# Patient Record
Sex: FEMALE | Race: WHITE | NOT HISPANIC OR LATINO | Employment: FULL TIME | URBAN - METROPOLITAN AREA
[De-identification: names, ages, dates, MRNs, and addresses within clinical notes are randomized per-mention and may not be internally consistent; named-entity substitution may affect disease eponyms.]

---

## 2017-01-28 ENCOUNTER — TELEPHONE (OUTPATIENT)
Dept: OTHER | Facility: CLINIC | Age: 37
End: 2017-01-28

## 2017-02-14 ENCOUNTER — INFUSION - HEALTHEAST (OUTPATIENT)
Dept: INFUSION THERAPY | Facility: CLINIC | Age: 37
End: 2017-02-14

## 2017-02-14 DIAGNOSIS — N39.0 URINARY TRACT INFECTION: ICD-10-CM

## 2017-08-14 ENCOUNTER — RECORDS - HEALTHEAST (OUTPATIENT)
Dept: LAB | Facility: CLINIC | Age: 37
End: 2017-08-14

## 2017-08-14 LAB
ALT SERPL W P-5'-P-CCNC: 16 U/L (ref 0–45)
AST SERPL W P-5'-P-CCNC: 14 U/L (ref 0–40)
CREAT SERPL-MCNC: 0.67 MG/DL (ref 0.6–1.1)
GFR SERPL CREATININE-BSD FRML MDRD: >60 ML/MIN/1.73M2

## 2017-08-28 ENCOUNTER — ANESTHESIA - HEALTHEAST (OUTPATIENT)
Dept: OBGYN | Facility: CLINIC | Age: 37
End: 2017-08-28

## 2017-08-28 ENCOUNTER — SURGERY - HEALTHEAST (OUTPATIENT)
Dept: OBGYN | Facility: CLINIC | Age: 37
End: 2017-08-28

## 2017-08-29 ENCOUNTER — SURGERY - HEALTHEAST (OUTPATIENT)
Dept: OBGYN | Facility: CLINIC | Age: 37
End: 2017-08-29

## 2017-08-29 ASSESSMENT — MIFFLIN-ST. JEOR: SCORE: 1843.89

## 2017-09-01 ENCOUNTER — HOME CARE/HOSPICE - HEALTHEAST (OUTPATIENT)
Dept: HOME HEALTH SERVICES | Facility: HOME HEALTH | Age: 37
End: 2017-09-01

## 2017-09-05 ENCOUNTER — COMMUNICATION - HEALTHEAST (OUTPATIENT)
Dept: OBGYN | Facility: CLINIC | Age: 37
End: 2017-09-05

## 2017-09-17 ENCOUNTER — COMMUNICATION - HEALTHEAST (OUTPATIENT)
Dept: SCHEDULING | Facility: CLINIC | Age: 37
End: 2017-09-17

## 2017-12-15 ENCOUNTER — OFFICE VISIT (OUTPATIENT)
Dept: PODIATRY | Facility: CLINIC | Age: 37
End: 2017-12-15
Payer: COMMERCIAL

## 2017-12-15 DIAGNOSIS — M72.2 PLANTAR FASCIAL FIBROMATOSIS: Primary | ICD-10-CM

## 2017-12-15 PROCEDURE — 20550 NJX 1 TENDON SHEATH/LIGAMENT: CPT | Mod: RT | Performed by: PODIATRIST

## 2017-12-15 PROCEDURE — 99203 OFFICE O/P NEW LOW 30 MIN: CPT | Mod: 25 | Performed by: PODIATRIST

## 2017-12-15 RX ORDER — TRIAMCINOLONE ACETONIDE 40 MG/ML
40 INJECTION, SUSPENSION INTRA-ARTICULAR; INTRAMUSCULAR ONCE
Qty: 2 ML | Refills: 0 | OUTPATIENT
Start: 2017-12-15 | End: 2017-12-15

## 2017-12-15 NOTE — MR AVS SNAPSHOT
After Visit Summary   12/15/2017    Mary Reyes    MRN: [id number]           Patient Information     Date Of Birth          1980        Visit Information        Provider Department      12/15/2017 9:30 AM Adam Blair DPM [facility]        Care Instructions    Thank you for choosing [facility]!    DR. BLAIR'S CLINIC LOCATIONS:   MONDAY - [place] TUESDAY - [place]   [address] [facility]  [address]   [place] [address] [place] [address]   [phone]       THURSDAY AM - [place] THURSDAY PM - [place]   [address]   [place] [address] [place] [address]   [phone]       FRIDAY AM - [place] SET UP SURGERY: [phone] [address] APPOINTMENTS: [phone]   [place] [address] BILLING QUESTIONS: [phone] FAX NUMBER: [phone]     Follow Up: as needed    PLANTAR FASCIITIS    Plantar fasciitis is often referred to as heel spurs or heel pain. Plantar fasciitis is a very common problem that affects people of all foot shapes, age, weight and activity level. Pain may be in the arch or on the weight-bearing surface of the heel. The pain may come on without injury or identifiable cause. Pain is generally present when first getting out of bed in the morning or up from a seated break.     CAUSES  The plantar fascia is a dense fibrous band of tissue that stretches across the bottom surface of the foot. The fascia helps support the foot muscles and arch. Plantar fasciitis is thought to be caused by mechanical strain or overload. Frequent walking without shoes or wearing unsupportive shoes is thought to cause structural overload and ultimately inflammation of the plantar fascia. Some people have heel spurs that can be seen on x-ray. The heel spur is actually a minor component of plantar fascitis and is largely ignored.       SELF  TREATMENT   The easiest solution is to stop walking around your home without shoes. Plantar fasciitis is largely a shoe problem. Shoes are either not being worn often enough or your current shoes are inadequate for your weight, foot structure or activity level. The majority of shoes on the market today are not sufficient to resist development of plantar fasciitis or to promote healing. Assume that your current shoes are inadequate and will need to be replaced. Even high quality shoes wear out with 6 months to one year of frequent use. Weight loss is another option. Losing ten pounds in the next two months may be enough to resolve the problem. Ice applied to the area of pain two to three times per day for ten minutes each session can be very helpful. This should continue until the problem resolves. Achilles tendon stretching is essential. Stretch multiple times daily to promote healing and to prevent recurrence in the future.     MEDICAL TREATMENT  Medical treatments often include custom arch supports, cortisone injections, physical therapy, splints to be worn in bed, prescription medications and surgery. The home treatments listed above will be necessary regardless of these advanced medical treatments. Surgery is rarely needed but is very helpful in selected cases.     PROGNOSIS  Plantar fasciitis can last from one day to a lifetime. Some people get intermittent fascitis that is very short-lived. Others suffer daily for years. Excessive body weight, frequent bare foot walking, long hours on the feet, inadequate shoes, predisposing foot structures and excessive activity such as running are all potential issues that lead to chronic and/or recurring plantar fascitis. Having plantar fasciitis means that you are forever prone to this problem and will require modification of some of the above factors. Most people seek treatment within one to four months. Healing usually requires a similar one to four month time frame.  Healing time is relative to the amount of effort spent treating the problem.   Plantar fasciitis is highly recurrent. Risk factors often continue, including return to bare foot walking, inadequate shoes, excessive body weight, excessive activities, etc. Your life style and foot structure may predispose you to recurrent plantar fasciitis. A daily prevention regimen can be very helpful. Ongoing use of shoe inserts, careful attention to appropriate shoes, daily Achilles stretching, etc. may prevent recurrence. Prompt attention at the earliest warning signs of heel pain can resolve the problem in as short as a few days.     EXERCISES    Stair Exercise: Step on the stairs with the ball of your foot and hold your position for at least 15 seconds, then slowly step down with the heels of your foot. You can do this daily and as often as you want.   Picking the Towel: Sit comfortably and then pick the towel up with your toes. You can use any object other than a towel as long as the material can be soft and you can pick it up with your toes.  Rolling the Bottle: Use a small ball or frozen water bottle and then roll it around with your foot.   Flex the Toes: Sit comfortably and then flex your toes by pointing it towards the floor or towards your body. This will relax and flex your foot and exercise your plantar fascia, the calf, and the Achilles tendon. The inability of the foot to stretch often causes the bunching up of the plantar fascia area leading to the pain.  Calf/Achilles Stretching: Lay on you back and raise one foot, then point your toes towards the floor. See photo below:               Hold each stretch for 10 seconds. Stretch 10 times per set, three sets per day. Morning, afternoon and evening. If your heel pain is very severe in the morning, consider doing the first set of stretches before you get out of bed.    THERAPIES COVERED:  1.  Supportive Shoes: minimizing barefoot ambulation helps to provide cushion,  padding and support to the ligament that is inflamed. Socks, flip flops, flats and some slippers are not typically sufficient to provide support. Shoes should be worn even indoors  2.  Insert/Orthotics: ones with an arch support built in to them provide further stress relief for the ligament. See the information below on recommended inserts.  3. Icing: using a frozen water bottle or orange, and rolling it along the bottom of the arch/heel can help to alleviate discomfort, and can act as a tissue massage to the painful, inflamed ligament.  There is evidence that shows icing at least three times daily can be beneficial  4.  Antiinflammatory (NSAID): Ibuprofen, Aleve, as well as Tylenol can be used to help decrease symptoms and improve pain levels. If you have high blood pressure, heart disease, stomach or kidney problems, use antiinflammatories sparingly. Tylenol should not be used if you have liver problems.   5. Activity Modifications: if there are certain things that you do, whether it's going barefoot or certain shoes/activities, you should try to minimize those activities as much as possible until your symptoms are sufficiently resolved. Certainly, some activities, such as running on the treadmill, are easier to take a break from versus others, such as work or chores at home. If there are certain activities that hurt your heel, and you keep doing those activities that hurt your heel, your heel will keep hurting.  **If these initial therapies are insufficient, we have our tier 2 therapies that can more aggressively work to improve your symptoms and get you back to the activities that you enjoy!    OVER THE COUNTER INSERTS    SuperFeet   Sofsole Fit Spenco   Power Step   Walk-Fit Arch Cradles     Most of these can be found at your local [employer], sporting [employer], or online.  **A good high quality over the counter insert should cost around $40-$50      [employer] [facility]  [address]  [facility]  [phone]   [address]  [phone] [place]  [address] [facility]  [phone]   [place]  [address]  [phone]   [place]  [address]  [phone] [place]  [address]  [phone]   [place]  [address]  [phone]   [place]  [address] [facility]  [phone] [place]  [address]  [phone]           Body Mass Index (BMI)  Many things can cause foot and ankle problems. Foot structure, activity level, foot mechanics and injuries are common causes of pain. One very important issue that often goes unmentioned, is body weight. Extra weight can cause increased stress on muscles, ligaments, bones and tendons. Sometimes just a few extra pounds is all it takes to put one over her/his threshold. Without reducing that stress, it can be difficult to alleviate pain. Some people are uncomfortable addressing this issue, but we feel it is important for you to think about it. As Foot &  Ankle specialists, our job is addressing the lower extremity problem and possible causes. Regarding extra body weight, we encourage patients to discuss diet and weight management plans with their primary care doctors. It is this team approach that gives you the best opportunity for pain relief and getting you back on your feet.            Follow-ups after your visit        Your next 10 appointments already scheduled     Dec 21, 2017 12:10 PM CST   PROCEDURE with Charity Delaney PA-C   [facility] ([facility])    [address] [place] [address] [phone]              Who to contact     If you have questions or need follow up information about today's clinic visit or your schedule please contact [facility] directly at [phone].  Normal or non-critical lab and imaging results will be communicated to you by  "MyChart, letter or phone within 4 business days after the clinic has received the results. If you do not hear from us within 7 days, please contact the clinic through [employer] or phone. If you have a critical or abnormal lab result, we will notify you by phone as soon as possible.  Submit refill requests through [email or web address] or call your pharmacy and they will forward the refill request to us. Please allow 3 business days for your refill to be completed.          Additional Information About Your Visit        [employer] Information     [email or web address] lets you send messages to your doctor, view your test results, renew your prescriptions, schedule appointments and more. To sign up, go to [email or web address] . Click on \"Log in\" on the left side of the screen, which will take you to the Welcome page. Then click on \"Sign up Now\" on the right side of the page.     You will be asked to enter the access code listed below, as well as some personal information. Please follow the directions to create your username and password.     Your access code is: [email or web address]  Expires: 3/15/2018 10:05 AM     Your access code will  in 90 days. If you need help or a new code, please call your [facility] clinic or [phone].        Care EveryWhere ID     This is your Care EveryWhere ID. This could be used by other organizations to access your [facility] medical records  [id number]         Blood Pressure from Last 3 Encounters:   16 106/69   14 137/72   13 113/68    Weight from Last 3 Encounters:   16 192 lb 8 oz (87.3 kg)   12/30/15 193 lb 4.8 oz (87.7 kg)   04/23/15 173 lb (78.5 kg)              Today, you had the following     No orders found for display       Primary Care Provider Fax #    [facility] [phone]       [facility] [address]        Equal Access to Services     SAMAN[place]A [place] : nava Leyva, waqar cortez " chinmay finchradha gaonaaan ah. So [facility] [phone].    ATENCIÓN: Si franciscola riccardo, tiene a cobian disposición servicios gratuitos de asistencia lingüística. Chen al [phone].    We comply with applicable federal civil rights laws and [place] laws. We do not discriminate on the basis of race, color, national origin, age, disability, sex, sexual orientation, or gender identity.            Thank you!     Thank you for choosing [facility]  for your care. Our goal is always to provide you with excellent care. Hearing back from our patients is one way we can continue to improve our services. Please take a few minutes to complete the written survey that you may receive in the mail after your visit with us. Thank you!             Your Updated Medication List - Protect others around you: Learn how to safely use, store and throw away your medicines at www.disposemymeds.org.          This list is accurate as of: 12/15/17 10:05 AM.  Always use your most recent med list.                   Brand Name Dispense Instructions for use Diagnosis    Biotin 1 MG Caps      Take by mouth daily        CALCIUM + D PO      Take  by mouth. 1 PO each meal        FISH OIL CONCENTRATE PO      Take 2 capsules by mouth daily        IRON CR PO      Take by mouth daily        MULTI-VITAMIN PO      Take  by mouth daily. 2 PO daily.        VITAMIN B-12 SL      Place 2,000 mcg under the tongue daily.        VITAMIN D (ERGOCALCIFEROL) PO      Take 2,000 Int'l Units by mouth. 2 daily.        ZOLPIDEM TARTRATE PO      Take 10 mg by mouth nightly as needed.

## 2017-12-15 NOTE — PROGRESS NOTES
"Foot & Ankle Surgery  December 15, 2017    CC: bilateral heel pain L>R    I was asked to see Mary Eric regarding the chief complaint by:  self    HPI:  Pt is a 36 year old female who presents with above complaint.  Bilateral heel pain L>R x \"months\". No injury noted.  Previous history of plantar fasciitis, this improved but she's had a recurrence of discomfort.  Had a baby 3 months ago.  Points to plantar heels, describes AM pain.  2/10 daily.  Worse in AM. Has done stretches, ice, rolling.      ROS:   Pos for CC.  The patient denies current nausea, vomiting, chills, fevers, belly pain, calf pain, chest pain or SOB.  Complete remainder of ROS is otherwise neg.    VITALS:  There were no vitals filed for this visit.    PMH:    Past Medical History:   Diagnosis Date     PONV (postoperative nausea and vomiting)        SXHX:    Past Surgical History:   Procedure Laterality Date     ENT SURGERY      wisdom teeth out     LAPAROSCOPIC GASTRIC ADJUSTABLE BANDING  1/26/2012    Procedure:LAPAROSCOPIC GASTRIC ADJUSTABLE BANDING; LAPAROSCOPIC  ADJUSTABLE GASTRIC BANDING ; Surgeon:TRAVIS RAO; Location: OR     LAPAROSCOPIC GASTRIC ADJUSTABLE BANDING  8/8/2013    Procedure: LAPAROSCOPIC GASTRIC ADJUSTABLE BANDING;  LAPAROSCOPIC REPOSITIONING OF SLIPPED LAP BAND AND LAP BAND PORT REPLACEMENT (RAPID PORT) ;  Surgeon: Travis Rao MD;  Location:  OR     REPLACE PORT GASTRIC BAND  8/8/2013    Procedure: REPLACE PORT GASTRIC BAND;;  Surgeon: Travis Rao MD;  Location:  OR        MEDS:    Current Outpatient Prescriptions   Medication     IRON CR PO     Omega-3 Fatty Acids (FISH OIL CONCENTRATE PO)     Biotin 1 MG CAPS     Calcium Carbonate-Vitamin D (CALCIUM + D PO)     Cyanocobalamin (VITAMIN B-12 SL)     ZOLPIDEM TARTRATE PO     Multiple Vitamin (MULTI-VITAMIN PO)     VITAMIN D, ERGOCALCIFEROL, PO     No current facility-administered medications for this visit.        ALL:   No Known Allergies    FMH:  No family " history on file.    SocHx:    Social History     Social History     Marital status: Single     Spouse name: N/A     Number of children: N/A     Years of education: N/A     Occupational History     Not on file.     Social History Main Topics     Smoking status: Former Smoker     Packs/day: 0.50     Years: 10.00     Types: Cigarettes     Quit date: 1/1/2006     Smokeless tobacco: Never Used     Alcohol use No     Drug use: No     Sexual activity: Not on file     Other Topics Concern     Not on file     Social History Narrative           EXAMINATION:  Gen:   No apparent distress  Neuro:   A&Ox3, no deficits  Psych:    Answering questions appropriately for age and situation with normal affect  Head:    NCAT  Eye:    Visual scanning without deficit  Ear:    Response to auditory stimuli wnl  Lung:    Non-labored breathing on RA noted  Abd:    NTND per patient report  Lymph:    Neg for pitting/non-pitting edema BLE  Vasc:    Pulses palpable, CFT minimally delayed  Neuro:    Light touch sensation intact to all sensory nerve distributions without paresthesias  Derm:    Neg for nodules, lesions or ulcerations  MSK:    Heel exam L>R - pain at PF insertion.  No calcaneal pain, no ICN/tibial nerve pain, no Achilles/PT tendon pain  Calf:    Neg for redness, swelling or tenderness      Assessment:  36 year old female with bilateral plantar fasciitis L>R      Plan:  Discussed etiologies and options  1.  Bilateral plantar fasciitis L>R  -Regarding the plantar fasciitis, the Plantar Fasciitis handout was dispensed and discussed.  We talked about stretching, resting/activity modification, icing, tylenol use as tolerated, inserts, supportive shoes and minimizing shoeless walking.    -discussed Achilles, plantar fascial and hamstring stretches  -OTC insert information dispensed and discussed   -injection bilateral heel, see procedure note    After obtaining written consent, the skin was prepped with alcohol.  The needle was advance to  the underlying left and right plantar fascial insertion, aspiration was done with position change.  1 1/2cc mixture of 2:1 kenalog 40:0.25% marcaine plain was injected.  The patient tolerated the procedure without complication.  Risks that were discussed included possible joint/soft tissue damage, neuritis/numbness, infection, pigment change, steroid flare.       Follow up:  Prn based on injection results or sooner with acute issues      Patient's medical history was reviewed today    There is no height or weight on file to calculate BMI.  Weight management plan: Patient was referred to their PCP to discuss a diet and exercise plan.        Adam Cannon DPM   Podiatric Foot & Ankle Surgeon  [facility]  [phone]

## 2017-12-15 NOTE — PATIENT INSTRUCTIONS
Thank you for choosing [facility]!    DR. BLAIR'S CLINIC LOCATIONS:   MONDAY - [place] TUESDAY - [place]   [address] [facility]  [address]   [place] [address] [place] [address]   [phone]       THURSDAY AM - [place] THURSDAY PM - [place]   [address]   [place] [address] [place] [address]   [phone]       FRIDAY AM - [place] SET UP SURGERY: [phone] [address] APPOINTMENTS: [phone]   [place] [address] BILLING QUESTIONS: [phone] FAX NUMBER: [phone]     Follow Up: as needed    PLANTAR FASCIITIS    Plantar fasciitis is often referred to as heel spurs or heel pain. Plantar fasciitis is a very common problem that affects people of all foot shapes, age, weight and activity level. Pain may be in the arch or on the weight-bearing surface of the heel. The pain may come on without injury or identifiable cause. Pain is generally present when first getting out of bed in the morning or up from a seated break.     CAUSES  The plantar fascia is a dense fibrous band of tissue that stretches across the bottom surface of the foot. The fascia helps support the foot muscles and arch. Plantar fasciitis is thought to be caused by mechanical strain or overload. Frequent walking without shoes or wearing unsupportive shoes is thought to cause structural overload and ultimately inflammation of the plantar fascia. Some people have heel spurs that can be seen on x-ray. The heel spur is actually a minor component of plantar fascitis and is largely ignored.       SELF TREATMENT   The easiest solution is to stop walking around your home without shoes. Plantar fasciitis is largely a shoe problem. Shoes are either not being worn often enough or your current shoes are inadequate for your weight, foot structure or activity level. The majority of shoes on the market today are  not sufficient to resist development of plantar fasciitis or to promote healing. Assume that your current shoes are inadequate and will need to be replaced. Even high quality shoes wear out with 6 months to one year of frequent use. Weight loss is another option. Losing ten pounds in the next two months may be enough to resolve the problem. Ice applied to the area of pain two to three times per day for ten minutes each session can be very helpful. This should continue until the problem resolves. Achilles tendon stretching is essential. Stretch multiple times daily to promote healing and to prevent recurrence in the future.     MEDICAL TREATMENT  Medical treatments often include custom arch supports, cortisone injections, physical therapy, splints to be worn in bed, prescription medications and surgery. The home treatments listed above will be necessary regardless of these advanced medical treatments. Surgery is rarely needed but is very helpful in selected cases.     PROGNOSIS  Plantar fasciitis can last from one day to a lifetime. Some people get intermittent fascitis that is very short-lived. Others suffer daily for years. Excessive body weight, frequent bare foot walking, long hours on the feet, inadequate shoes, predisposing foot structures and excessive activity such as running are all potential issues that lead to chronic and/or recurring plantar fascitis. Having plantar fasciitis means that you are forever prone to this problem and will require modification of some of the above factors. Most people seek treatment within one to four months. Healing usually requires a similar one to four month time frame. Healing time is relative to the amount of effort spent treating the problem.   Plantar fasciitis is highly recurrent. Risk factors often continue, including return to bare foot walking, inadequate shoes, excessive body weight, excessive activities, etc. Your life style and foot structure may predispose you to  recurrent plantar fasciitis. A daily prevention regimen can be very helpful. Ongoing use of shoe inserts, careful attention to appropriate shoes, daily Achilles stretching, etc. may prevent recurrence. Prompt attention at the earliest warning signs of heel pain can resolve the problem in as short as a few days.     EXERCISES    Stair Exercise: Step on the stairs with the ball of your foot and hold your position for at least 15 seconds, then slowly step down with the heels of your foot. You can do this daily and as often as you want.   Picking the Towel: Sit comfortably and then pick the towel up with your toes. You can use any object other than a towel as long as the material can be soft and you can pick it up with your toes.  Rolling the Bottle: Use a small ball or frozen water bottle and then roll it around with your foot.   Flex the Toes: Sit comfortably and then flex your toes by pointing it towards the floor or towards your body. This will relax and flex your foot and exercise your plantar fascia, the calf, and the Achilles tendon. The inability of the foot to stretch often causes the bunching up of the plantar fascia area leading to the pain.  Calf/Achilles Stretching: Lay on you back and raise one foot, then point your toes towards the floor. See photo below:               Hold each stretch for 10 seconds. Stretch 10 times per set, three sets per day. Morning, afternoon and evening. If your heel pain is very severe in the morning, consider doing the first set of stretches before you get out of bed.    THERAPIES COVERED:  1.  Supportive Shoes: minimizing barefoot ambulation helps to provide cushion, padding and support to the ligament that is inflamed. Socks, flip flops, flats and some slippers are not typically sufficient to provide support. Shoes should be worn even indoors  2.  Insert/Orthotics: ones with an arch support built in to them provide further stress relief for the ligament. See the information  below on recommended inserts.  3. Icing: using a frozen water bottle or orange, and rolling it along the bottom of the arch/heel can help to alleviate discomfort, and can act as a tissue massage to the painful, inflamed ligament.  There is evidence that shows icing at least three times daily can be beneficial  4.  Antiinflammatory (NSAID): Ibuprofen, Aleve, as well as Tylenol can be used to help decrease symptoms and improve pain levels. If you have high blood pressure, heart disease, stomach or kidney problems, use antiinflammatories sparingly. Tylenol should not be used if you have liver problems.   5. Activity Modifications: if there are certain things that you do, whether it's going barefoot or certain shoes/activities, you should try to minimize those activities as much as possible until your symptoms are sufficiently resolved. Certainly, some activities, such as running on the treadmill, are easier to take a break from versus others, such as work or chores at home. If there are certain activities that hurt your heel, and you keep doing those activities that hurt your heel, your heel will keep hurting.  **If these initial therapies are insufficient, we have our tier 2 therapies that can more aggressively work to improve your symptoms and get you back to the activities that you enjoy!    OVER THE COUNTER INSERTS    SuperFeet   Sofsole Fit Spenco   Power Step   Walk-Fit Arch Cradles     Most of these can be found at your local "[employer] sporting [employer], or online.  **A good high quality over the counter insert should cost around $40-$50      [employer] LOCATIONS    [address]  [phone]   [address]  [phone] [place]  [address] [place] [address]  [phone]   [place]  [address] [place] [address] [address].  [phone]   [place]  [address]  [phone] [place] [address]  [phone]   [place]  [address] [place] [address]  [phone]    [place]  [address] [facility]  [phone] [place]  [address] [facility] [phone]           Body Mass Index (BMI)  Many things can cause foot and ankle problems. Foot structure, activity level, foot mechanics and injuries are common causes of pain. One very important issue that often goes unmentioned, is body weight. Extra weight can cause increased stress on muscles, ligaments, bones and tendons. Sometimes just a few extra pounds is all it takes to put one over her/his threshold. Without reducing that stress, it can be difficult to alleviate pain. Some people are uncomfortable addressing this issue, but we feel it is important for you to think about it. As Foot &  Ankle specialists, our job is addressing the lower extremity problem and possible causes. Regarding extra body weight, we encourage patients to discuss diet and weight management plans with their primary care doctors. It is this team approach that gives you the best opportunity for pain relief and getting you back on your feet.

## 2017-12-21 ENCOUNTER — OFFICE VISIT (OUTPATIENT)
Dept: SURGERY | Facility: CLINIC | Age: 37
End: 2017-12-21
Payer: COMMERCIAL

## 2017-12-21 VITALS — WEIGHT: 217 LBS | BODY MASS INDEX: 35.02 KG/M2

## 2017-12-21 DIAGNOSIS — Z98.84 BARIATRIC SURGERY STATUS: Primary | ICD-10-CM

## 2017-12-21 DIAGNOSIS — E66.9 OBESITY (BMI 30-39.9): ICD-10-CM

## 2017-12-21 DIAGNOSIS — Z46.51 FITTING AND ADJUSTMENT OF GASTRIC LAP BAND: ICD-10-CM

## 2017-12-21 PROCEDURE — S2083 ADJUSTMENT GASTRIC BAND: HCPCS | Performed by: PHYSICIAN ASSISTANT

## 2017-12-21 PROCEDURE — 99207 ZZC NO CHARGE LOS: CPT | Performed by: PHYSICIAN ASSISTANT

## 2017-12-21 NOTE — MR AVS SNAPSHOT
"              After Visit Summary   12/21/2017    Mary Reyes    MRN: [id number]           Patient Information     Date Of Birth          1980        Visit Information        Provider Department      12/21/2017 12:10 PM Charity Delaney PA-C [facility]      Today's Diagnoses     Bariatric surgery status    -  1    Obesity (BMI 30-39.9)        Fitting and adjustment of gastric lap band           Follow-ups after your visit        Follow-up notes from your care team     Return if symptoms worsen or fail to improve, for Band Assessment.      Who to contact     If you have questions or need follow up information about today's clinic visit or your schedule please contact [place] [facility] directly at [phone].  Normal or non-critical lab and imaging results will be communicated to you by [employer], letter or phone within 4 business days after the clinic has received the results. If you do not hear from us within 7 days, please contact the clinic through [employer] or phone. If you have a critical or abnormal lab result, we will notify you by phone as soon as possible.  Submit refill requests through [employer] or call your pharmacy and they will forward the refill request to us. Please allow 3 business days for your refill to be completed.          Additional Information About Your Visit        MyChart Information     [employer] lets you send messages to your doctor, view your test results, renew your prescriptions, schedule appointments and more. To sign up, go to [email or web address] . Click on \"Log in\" on the left side of the screen, which will take you to the Welcome page. Then click on \"Sign up Now\" on the right side of the page.     You will be asked to enter the access code listed below, as well as some personal information. Please follow the directions to create your username and password.   "   Your access code is: [email or web address]  Expires: 3/15/2018 10:05 AM     Your access code will  in 90 days. If you need help or a new code, please call your [facility] clinic or [phone].        Care EveryWhere ID     This is your Care EveryWhere ID. This could be used by other organizations to access your [facility] medical records  [id number]        Your Vitals Were     BMI (Body Mass Index)                   35.02 kg/m2            Blood Pressure from Last 3 Encounters:   16 106/69   14 137/72   13 113/68    Weight from Last 3 Encounters:   17 217 lb (98.4 kg)   16 192 lb 8 oz (87.3 kg)   12/30/15 193 lb 4.8 oz (87.7 kg)              We Performed the Following     Lap Band Adjustment - Clinic     OP ROOMING NOTE TO CECIL        Primary Care Provider Fax #    [facility] [phone]       [facility] [address]        Equal Access to Services     SAMANTHA [place] : Hadii aad ku hadasho Soomaali, waaxda luqadaha, qaybta kaalmada adeegyada, waxay idiin haylexi joel . So [facility] [phone].    ATENCIÓN: Si habla español, tiene a cobian disposición servicios gratuitos de asistencia lingüística. Llame al [phone].    We comply with applicable federal civil rights laws and [place] laws. We do not discriminate on the basis of race, color, national origin, age, disability, sex, sexual orientation, or gender identity.            Thank you!     Thank you for choosing [place] [facility]  for your care. Our goal is always to provide you with excellent care. Hearing back from our patients is one way we can continue to improve our services. Please take a few minutes to complete the written survey that you may receive in the mail after your visit with us. Thank you!             Your Updated Medication List - Protect others around you: Learn how to safely use, store and throw away your medicines at  www.disposemymeds.org.          This list is accurate as of: 12/21/17  1:10 PM.  Always use your most recent med list.                   Brand Name Dispense Instructions for use Diagnosis    Biotin 1 MG Caps      Take by mouth daily        CALCIUM + D PO      Take  by mouth. 1 PO each meal        FISH OIL CONCENTRATE PO      Take 2 capsules by mouth daily        IRON CR PO      Take by mouth daily        MULTI-VITAMIN PO      Take  by mouth daily. 2 PO daily.        VITAMIN B-12 SL      Place 2,000 mcg under the tongue daily.        VITAMIN D (ERGOCALCIFEROL) PO      Take 2,000 Int'l Units by mouth. 2 daily.        ZOLPIDEM TARTRATE PO      Take 10 mg by mouth nightly as needed.

## 2017-12-21 NOTE — PROGRESS NOTES
BAND ASSESSMENT VISIT  December 21, 2017    VITALS:          Weight: 217 lb (98.4 kg)         Wt change since last visit (lbs): 24.69         Cumulative weight loss (lbs): 34.1    SUBJECTIVE:  Patient comes to the clinic today for band assessment.  In regards to the patient's band, the patient feels they need fluid removed from their band. She is satisfied with her weight.  She is not exercising 3x weekly or more.  Feels she has been too tight for over year or more. Was lst seen in the clinic last December.  At that time qw took out 1 ml of fluid.  2 weeks later she found out she was pregnant.  Although she was too tight, she became busy and used to this feeling and continued in this pattern.    She is now 3 months post partum s/p c section..  Her daughter is healthy and named named Kelly.  She is in a different place right now than when she was when she got her band.  At that time she was in a bad marriage, very depressed, and felt she needed the band.  Now, she is in a happy relationship.   Is using a nutrition . Would like all her fluid removed from the band.  Is not sure she will still need to use it again.       BAND ROS:  Hungry between meals:    No  Eating between meals:    Yes, because she can't eat enough in one sitting.   Eat >1 cup of food at meals:    No  Not losing 1-2 lbs a week:    Yes  Not feeling sense of restriction:   No    Have pain when swallowing:    Yes  Have heartburn, vomiting or reflux:   Yes  Have night cough or hiccups:   Yes  Making poor food choices:    Yes  Unable to eat chicken, steak and bread: Yes    Is pregnant      No  Will be traveling to remote areas   No  Will have surgery soon.   No    ASSESSMENT:    1.  S/P adjustable band surgery  2.  Malnutrition following GI Surgery    PLAN: After evaluation, we have elected to adjust her gastric band. We discussed that the band is a tool.  It is adjustable. We are here if she feels she needs to have it adjusted to assist with hunger  and restriction.  We are also here if she has any problems with the band. Pt verbalized understanding.   Risk, benefits, and alternatives were reviewed before a consent was signed. Pt wishes to proceed. Pt will follow up as needed for subsequent assessment.    PROCEDURE:  Adjustment of gastric band     PROCEDURE DETAILS: In the clinic exam room, the patient was placed in supine position on the exam table. The area over the access port was prepped with an alcohol swab, gloves were donned, and a Vicente needle and syringe were directed into the port under palpation guidance. A small amount of saline was aspirated to verify location, and all the remaining fluid (around 4.5-5 ml) was removed into the port. Access needle was withdrawn and bandaid was applied. Patient sat up and drank 4 ounces of lukewarm water without difficulty. Pt should return to a liquid diet and advance as tolerated. Tight band warning signs were reviewed.  Pt left home in a stable and ambulatory condition.

## 2018-02-21 ENCOUNTER — TELEPHONE (OUTPATIENT)
Dept: SURGERY | Facility: CLINIC | Age: 38
End: 2018-02-21

## 2018-02-21 DIAGNOSIS — R11.10 INTERMITTENT VOMITING: ICD-10-CM

## 2018-02-21 DIAGNOSIS — E66.9 OBESITY (BMI 30-39.9): Primary | ICD-10-CM

## 2018-02-21 DIAGNOSIS — Z98.84 LAP-BAND SURGERY STATUS: ICD-10-CM

## 2018-02-21 NOTE — TELEPHONE ENCOUNTER
Patient is band patient from 8//8/2013.  Had a complete unfill 12/21/17.    Reports she is still getting food stuck > 1 month post  unfill even when trying different food textures.  Requesting return call.  Natalie Armas MS, RD, RN    Returned patient's call.   for patient to return call.  Natalie Armas MS, RD, RN      Patient returned call.  Getting food stuck was happening daily.  Then had compete unfill 12/21/17.  After unfill stuck food and vomiting after is occurring less often after unfill - maybe 3 times week.  Feels like it is every time she eats meat - pork and chix.  The next meal she can't eat well.   Example today she tried oatmeal and then later an egg bake that normally works and they didn't   She thinks the chix from last night maybe got stuck.    No pain - just uncomfortable and then vomits and most of the food she ate comes back up.  Not coughing in sleep with food coming up.  No reflux.   Able to get vitamins down.  Keeping well at 96 oz.   Not on an anti-acid.    Informed patient will discuss with PA-C and return call.  Patient verbalized understanding and is agreeable to plan.  Natalie Armas MS, RD, RN

## 2018-02-21 NOTE — TELEPHONE ENCOUNTER
Spoke with ELMER Hernandez.  Plan is to check for band placement.  David placed order.  Patient is also to be seen in clinic after band check for placement.    Patient called and informed re: above.  She was given radiology # to call and make appointment.  She will call back tomorrow after she checks her work schedule and call radiology.  She would like to continue working with ELMER Nash since she has done so in the past.  Patient is aware that Charity has appointments available next Thursday starting at [phone].  Natalie Armas, MS, RD, RN

## 2018-03-13 ENCOUNTER — TELEPHONE (OUTPATIENT)
Dept: SURGERY | Facility: CLINIC | Age: 38
End: 2018-03-13

## 2018-03-13 NOTE — TELEPHONE ENCOUNTER
Called patient and left message to call clinic back.      Was calling in regards to her [employer] message about 3 weeks ago.  An UGI was ordered but has not been done.  Want to make sure patient is doing ok.

## 2018-03-27 ENCOUNTER — TELEPHONE (OUTPATIENT)
Dept: SURGERY | Facility: CLINIC | Age: 38
End: 2018-03-27

## 2018-03-27 NOTE — TELEPHONE ENCOUNTER
Patient has not yet been called by radiology for UGI and is trying to schedule and told order not seen.    This writer called radiology.  Order found.  Radiology to call patient today to schedule.  Nataile Armas, MS, RD, RN

## 2018-03-30 ENCOUNTER — OFFICE VISIT (OUTPATIENT)
Dept: PODIATRY | Facility: CLINIC | Age: 38
End: 2018-03-30
Payer: COMMERCIAL

## 2018-03-30 ENCOUNTER — HOSPITAL ENCOUNTER (OUTPATIENT)
Dept: GENERAL RADIOLOGY | Facility: CLINIC | Age: 38
Discharge: HOME | End: 2018-03-30
Attending: PHYSICIAN ASSISTANT | Admitting: PHYSICIAN ASSISTANT
Payer: COMMERCIAL

## 2018-03-30 ENCOUNTER — OFFICE VISIT (OUTPATIENT)
Dept: SURGERY | Facility: CLINIC | Age: 38
End: 2018-03-30
Payer: COMMERCIAL

## 2018-03-30 VITALS
TEMPERATURE: 97.8 F | SYSTOLIC BLOOD PRESSURE: 118 MMHG | HEART RATE: 76 BPM | DIASTOLIC BLOOD PRESSURE: 78 MMHG | BODY MASS INDEX: 33.54 KG/M2 | WEIGHT: 207.8 LBS

## 2018-03-30 VITALS
DIASTOLIC BLOOD PRESSURE: 70 MMHG | SYSTOLIC BLOOD PRESSURE: 117 MMHG | BODY MASS INDEX: 34.57 KG/M2 | HEART RATE: 55 BPM | WEIGHT: 214.2 LBS | RESPIRATION RATE: 13 BRPM

## 2018-03-30 DIAGNOSIS — M72.2 PLANTAR FASCIAL FIBROMATOSIS: Primary | ICD-10-CM

## 2018-03-30 DIAGNOSIS — Z46.51 FITTING AND ADJUSTMENT OF GASTRIC LAP BAND: ICD-10-CM

## 2018-03-30 DIAGNOSIS — K91.2 POSTSURGICAL NONABSORPTION: ICD-10-CM

## 2018-03-30 DIAGNOSIS — E66.9 OBESITY (BMI 30-39.9): ICD-10-CM

## 2018-03-30 DIAGNOSIS — K91.0 VOMITING FOLLOWING GASTROINTESTINAL SURGERY: Primary | ICD-10-CM

## 2018-03-30 DIAGNOSIS — Z98.84 BARIATRIC SURGERY STATUS: ICD-10-CM

## 2018-03-30 DIAGNOSIS — Z98.84 LAP-BAND SURGERY STATUS: ICD-10-CM

## 2018-03-30 DIAGNOSIS — R11.10 INTERMITTENT VOMITING: ICD-10-CM

## 2018-03-30 PROCEDURE — S2083 ADJUSTMENT GASTRIC BAND: HCPCS | Performed by: PHYSICIAN ASSISTANT

## 2018-03-30 PROCEDURE — 74240 X-RAY XM UPR GI TRC 1CNTRST: CPT

## 2018-03-30 PROCEDURE — 20550 NJX 1 TENDON SHEATH/LIGAMENT: CPT | Mod: LT | Performed by: PODIATRIST

## 2018-03-30 PROCEDURE — 99213 OFFICE O/P EST LOW 20 MIN: CPT | Mod: 25 | Performed by: PHYSICIAN ASSISTANT

## 2018-03-30 RX ORDER — TRIAMCINOLONE ACETONIDE 40 MG/ML
40 INJECTION, SUSPENSION INTRA-ARTICULAR; INTRAMUSCULAR ONCE
Qty: 1 ML | Refills: 0 | OUTPATIENT
Start: 2018-03-30 | End: 2018-03-30

## 2018-03-30 NOTE — MR AVS SNAPSHOT
After Visit Summary   3/30/2018    Mary Reyes    MRN: [id number]           Patient Information     Date Of Birth          1980        Visit Information        Provider Department      3/30/2018 10:00 AM Adam Cannon DPM [facility]        Today's Diagnoses     Plantar fascial fibromatosis    -  1       Follow-ups after your visit        Follow-up notes from your care team     Return if symptoms worsen or fail to improve.      Your next 10 appointments already scheduled     Mar 30, 2018 11:30 AM CDT   XR UPPER GI with SHXR5   [facility] ([facility])    [address] [place] [id number] [phone]           Please bring a list of your current medicines to your exam. (Include vitamins, minerals and over-the-counter medicines.) Leave your valuables at home.  Tell the doctor if there is a chance you could be pregnant.  If you had a barium enema within two days of the exam, you will need to take 3 bisacodyl (Dulcolax) tablets the day before your exam.  Do not eat, chew gum, or smoke for 8 hours before your exam. Keep drinking clear liquids until 2 hours before the exam. Clear liquids include water, clear juice, black coffee or clear tea without milk, Gatorade, or clear soda.  Take your usual medicines unless your doctor tells you not to. Take them with small sips of water.  Please call the Imaging Department at your exam site with any questions.            Mar 30, 2018 12:30 PM CDT   Return Visit with Charity Delaney PA-C   [facility] ([facility])    [address] [place] [address] [address]  [facility] [id number] [phone]              Who to contact     If you have questions or need follow up information about today's clinic visit or your schedule please contact [facility] directly at [phone].  Normal or non-critical lab and imaging  "results will be communicated to you by MyChart, letter or phone within 4 business days after the clinic has received the results. If you do not hear from us within 7 days, please contact the clinic through [employer] or phone. If you have a critical or abnormal lab result, we will notify you by phone as soon as possible.  Submit refill requests through "[employer] or call your pharmacy and they will forward the refill request to us. Please allow 3 business days for your refill to be completed.          Additional Information About Your Visit        "[employer] Information     "[employer] lets you send messages to your doctor, view your test results, renew your prescriptions, schedule appointments and more. To sign up, go to [email or web address] . Click on \"Log in\" on the left side of the screen, which will take you to the Welcome page. Then click on \"Sign up Now\" on the right side of the page.     You will be asked to enter the access code listed below, as well as some personal information. Please follow the directions to create your username and password.     Your access code is: [email or web address]  Expires: 2018 10:20 AM     Your access code will  in 90 days. If you need help or a new code, please call your [place] clinic or [phone].        Care EveryWhere ID     This is your Care EveryWhere ID. This could be used by other organizations to access your [place] medical records  [id number]        Your Vitals Were     Pulse Temperature BMI (Body Mass Index)             76 97.8  F (36.6  C) (Oral) 33.54 kg/m2          Blood Pressure from Last 3 Encounters:   18 118/78   16 106/69   14 137/72    Weight from Last 3 Encounters:   18 207 lb 12.8 oz (94.3 kg)   17 217 lb (98.4 kg)   16 192 lb 8 oz (87.3 kg)              We Performed the Following     INJECTION SINGLE TENDON SHEATH/LIGAMENT          Today's Medication Changes          These changes are accurate as of 3/30/18 10:20 AM.  " If you have any questions, ask your nurse or doctor.               Start taking these medicines.        Dose/Directions    triamcinolone acetonide 40 MG/ML injection   Commonly known as:  KENALOG-40   Used for:  Plantar fascial fibromatosis   Started by:  Adam Cannon DPM        Dose:  40 mg   1 mL (40 mg) by INTRA-ARTICULAR route once for 1 dose   Quantity:  1 mL   Refills:  0            Where to get your medicines      Some of these will need a paper prescription and others can be bought over the counter.  Ask your nurse if you have questions.     You don't need a prescription for these medications     triamcinolone acetonide 40 MG/ML injection                Primary Care Provider Fax #    [facility] [phone]       [address]  [facility] [address]        Equal Access to Services     [place] : Bijal Suarez, wainocenciada eric, qaybta kaalmada adele, waqar iraheta. So [facility] [phone].    ATENCIÓN: Si habla español, tiene a cobian disposición servicios gratuitos de asistencia lingüística. Llame al [phone].    We comply with applicable federal civil rights laws and [place] laws. We do not discriminate on the basis of race, color, national origin, age, disability, sex, sexual orientation, or gender identity.            Thank you!     Thank you for choosing [facility]  for your care. Our goal is always to provide you with excellent care. Hearing back from our patients is one way we can continue to improve our services. Please take a few minutes to complete the written survey that you may receive in the mail after your visit with us. Thank you!             Your Updated Medication List - Protect others around you: Learn how to safely use, store and throw away your medicines at www.disposemymeds.org.          This list is accurate as of 3/30/18 10:20 AM.  Always use your most recent med list.                    Brand Name Dispense Instructions for use Diagnosis    Biotin 1 MG Caps      Take by mouth daily        CALCIUM + D PO      Take  by mouth. 1 PO each meal        FISH OIL CONCENTRATE PO      Take 2 capsules by mouth daily        IRON CR PO      Take by mouth daily        MULTI-VITAMIN PO      Take  by mouth daily. 2 PO daily.        triamcinolone acetonide 40 MG/ML injection    KENALOG-40    1 mL    1 mL (40 mg) by INTRA-ARTICULAR route once for 1 dose    Plantar fascial fibromatosis       VITAMIN B-12 SL      Place 2,000 mcg under the tongue daily.        VITAMIN D (ERGOCALCIFEROL) PO      Take 2,000 Int'l Units by mouth. 2 daily.        ZOLPIDEM TARTRATE PO      Take 10 mg by mouth nightly as needed.

## 2018-03-30 NOTE — MR AVS SNAPSHOT
"              After Visit Summary   3/30/2018    Mary Reyes    MRN: [id number]           Patient Information     Date Of Birth          1980        Visit Information        Provider Department      3/30/2018 12:30 PM Charity Delaney PA-C [facility]      Today's Diagnoses     Vomiting following gastrointestinal surgery    -  1    Bariatric surgery status        Fitting and adjustment of gastric lap band        Postsurgical nonabsorption           Follow-ups after your visit        Who to contact     If you have questions or need follow up information about today's clinic visit or your schedule please contact [facility] directly at [phone].  Normal or non-critical lab and imaging results will be communicated to you by 3CI[employer], letter or phone within 4 business days after the clinic has received the results. If you do not hear from us within 7 days, please contact the clinic through [employer] or phone. If you have a critical or abnormal lab result, we will notify you by phone as soon as possible.  Submit refill requests through [employer] or call your pharmacy and they will forward the refill request to us. Please allow 3 business days for your refill to be completed.          Additional Information About Your Visit        MyChart Information     [employer] lets you send messages to your doctor, view your test results, renew your prescriptions, schedule appointments and more. To sign up, go to [email or web address] . Click on \"Log in\" on the left side of the screen, which will take you to the Welcome page. Then click on \"Sign up Now\" on the right side of the page.     You will be asked to enter the access code listed below, as well as some personal information. Please follow the directions to create your username and password.     Your access code is: [email or web address]  Expires: 6/28/2018 10:20 " AM     Your access code will  in 90 days. If you need help or a new code, please call your [place] clinic or [phone].        Care EveryWhere ID     This is your Care EveryWhere ID. This could be used by other organizations to access your [place] medical records  [id number]        Your Vitals Were     Pulse Respirations BMI (Body Mass Index)             55 13 34.57 kg/m2          Blood Pressure from Last 3 Encounters:   18 117/70   18 118/78   16 106/69    Weight from Last 3 Encounters:   18 214 lb 3.2 oz (97.2 kg)   18 207 lb 12.8 oz (94.3 kg)   17 217 lb (98.4 kg)              We Performed the Following     Lap Band Adjustment - Clinic          Today's Medication Changes          These changes are accurate as of 3/30/18  3:19 PM.  If you have any questions, ask your nurse or doctor.               Start taking these medicines.        Dose/Directions    triamcinolone acetonide 40 MG/ML injection   Commonly known as:  KENALOG-40   Used for:  Plantar fascial fibromatosis   Started by:  Adam Cannon DPM        Dose:  40 mg   1 mL (40 mg) by INTRA-ARTICULAR route once for 1 dose   Quantity:  1 mL   Refills:  0            Where to get your medicines      Some of these will need a paper prescription and others can be bought over the counter.  Ask your nurse if you have questions.     You don't need a prescription for these medications     triamcinolone acetonide 40 MG/ML injection                Primary Care Provider Fax #    [facility] [phone]       [facility] [address]        Equal Access to Services     [place] AH: Hadii fran coyle Socody, waaxda luqadaha, qaybta kaalmawaqar card. So [facility] [phone].    ATENCIÓN: Si habla español, tiene a cobian disposición servicios gratuitos de asistencia lingüística. Llame al [phone].    We comply with applicable  federal civil rights laws and [place] laws. We do not discriminate on the basis of race, color, national origin, age, disability, sex, sexual orientation, or gender identity.            Thank you!     Thank you for choosing [facility]  for your care. Our goal is always to provide you with excellent care. Hearing back from our patients is one way we can continue to improve our services. Please take a few minutes to complete the written survey that you may receive in the mail after your visit with us. Thank you!             Your Updated Medication List - Protect others around you: Learn how to safely use, store and throw away your medicines at www.disposemymeds.org.          This list is accurate as of 3/30/18  3:19 PM.  Always use your most recent med list.                   Brand Name Dispense Instructions for use Diagnosis    Biotin 1 MG Caps      Take by mouth daily        CALCIUM + D PO      Take  by mouth. 1 PO each meal        FISH OIL CONCENTRATE PO      Take 2 capsules by mouth daily        IRON CR PO      Take by mouth daily        MULTI-VITAMIN PO      Take  by mouth daily. 2 PO daily.        triamcinolone acetonide 40 MG/ML injection    KENALOG-40    1 mL    1 mL (40 mg) by INTRA-ARTICULAR route once for 1 dose    Plantar fascial fibromatosis       VITAMIN B-12 SL      Place 2,000 mcg under the tongue daily.        VITAMIN D (ERGOCALCIFEROL) PO      Take 2,000 Int'l Units by mouth. 2 daily.        ZOLPIDEM TARTRATE PO      Take 10 mg by mouth nightly as needed.

## 2018-03-30 NOTE — PROGRESS NOTES
"Foot & Ankle Surgery   March 30, 2018    S:  Pt is seen today for evaluation of heel pain.  She was previously seen 3 1/2 months ago for bilateral heel pain.  Underwent bilateral heel injections and was instructed on tier 1 plantar fasciitis therapies.  The injections worked \"wonderful\".  The right heel is doing better but the left heel is starting to bother her more and more.  She wears comfortable shoes and has inserts, as well as doing the stretching exercises.  She has a young baby and would like to start exercising more.    Vitals:    03/30/18 0959   BP: 118/78   BP Location: Right arm   Patient Position: Chair   Cuff Size: Adult Large   Pulse: 76   Temp: 97.8  F (36.6  C)   TempSrc: Oral   Weight: 207 lb 12.8 oz (94.3 kg)   '      ROS - Pos for CC.  Patient denies current nausea, vomiting, chills, fevers, belly pain, calf pain, chest pain or SOB.  Complete remainder of ROS it otherwise neg.      PE:  Gen:   No apparent distress  Eye:    Visual scanning without deficit  Ear:    Response to auditory stimuli wnl  Lung:    Non-labored breathing on RA noted  Abd:    NTND per patient report  Lymph:    Neg for pitting/non-pitting edema BLE  Vasc:    Pulses palpable, CFT minimally delayed  Neuro:    Light touch sensation intact to all sensory nerve distributions without paresthesias  Derm:    Neg for nodules, lesions or ulcerations  MSK:    L heel - pain at plantar medial heel.  No ICN/tibial nerve, calcaneus or Achilles tendon pain.    Calf:    Neg for redness, swelling or tenderness    Assessment:  37 year old female with plantar fasciitis left lower extremity       Plan:  Discussed etiologies, anatomy and options  1.  Plantar fasciitis left lower extremity   -reviewed tier 1 therapies with patient, including shoes, inserts, RICE/NSAID and stretching.  Continue all  -repeat left heel plantar fascia injection, see procedure note  -discussed tier 2 options, including orthotics, boot, night splint, PT, imaging and " surgical options, as she's quite frustrated with this.    After obtaining written consent, the skin was prepped with alcohol.  The needle was advance to the underlying plantar fascia insertion, aimed superior to the ligament, aspiration was done with position change.  1 1/2cc mixture of 2:1 kenalog 40:0.25% marcaine plain was injected.  The patient tolerated the procedure without complication.  Risks that were discussed included possible joint/soft tissue damage, neuritis/numbness, infection, pigment change, steroid flare.       Follow up:  Prn based on injection results or sooner with acute issues      Body mass index is 33.54 kg/(m^2).  Weight management plan: Patient was referred to their PCP to discuss a diet and exercise plan.         Adam Cannon DPM   Podiatric Foot & Ankle Surgeon  [facility] [place]  [phone]

## 2018-03-30 NOTE — PROGRESS NOTES
BAND ASSESSMENT VISIT     March 30, 2018      HISTORY OF PRESENT ILLNESS: SUBJECTIVE:  Patient comes to the clinic today for band assessment.  In regards to the patient's band, the patient feels they need fluid removed from their band.  She was last seen on 12/21/17 and at that time had all the fluid removed from her band they we could get.  She reports she is still getting food stuck > 1 month post unfill even when trying different food textures.    Previous to her unfill in December food  Getting stuck was happening daily. After unfill it occurs about 3 times a week.  Feels like it is every time she eats meat chicken, pork, steak. Occurs with broccoli, carrots, salads.   Not occurring with liquids.  Can eat 3-4 oz of meat at a time, but it sits there.  2 hours she tries to sips something and she gets pressure and  The only way to feel better is for her to vomit it back up.      Easy foods that are high carb and high calorie usually go down fine. Can eat oatmeal. Soups works.  Candy works.  Kraft Mac and cheese works. Is concerned if she continues with this she will gain weight.     She gets no pain - just uncomfortable and then vomits and most of the food she ate comes back up. Denies nighttime cough, reflux.  Able to get vitamins down. Keeping well hydrated at 96 oz. Fluids and pureed foods do not cause a problem.  Is not on an antacid.     BARIATRIC METRICS:  Current Weight: 214 lb 3.2 oz (97.2 kg)  Body mass index is 34.57 kg/(m^2).   Wt change since last visit (lbs): -2.8  Cumulative weight loss (lbs): 36.9.    /70  Pulse 55  Resp 13  Wt 214 lb 3.2 oz (97.2 kg)  BMI 34.57 kg/m2      LABS/IMAGING/MEDICAL RECORDS REVIEW:  3/30/18 UGI.  Normal looking UGI.  The band is in normal position. Fluid passed easily through the band.    PHYSICAL EXAMINATION:   /70  Pulse 55  Resp 13  Wt 214 lb 3.2 oz (97.2 kg)  BMI 34.57 kg/m2    GENERAL: Alert and oriented x3. NAD  HEART: No murmurs, rubs or gallops,  Regular rate and rhythm  LUNGS: Breathing unlabored, Lung sounds clear to auscultation bilaterally  ABDOMEN: soft; nontender; nondistended, incision well healed. No hernia  EXTREMITIES: No LE edema bilaterally, Gait normal  SKIN: No intertriginous irritation or rash      ASSESSMENT:     1.  S/P adjustable band surgery  2.  Malnutrition following GI Surgery     PLAN:     Reviewed UGI film in clinic with pt.  Normal looking UGI.  The band is in normal position. Fluid passed easily through the band.      After evaluation, we have elected to adjust her gastric band. Although we removed all the fluid we could at her last adjustment, We discussed trying to removing any remaining residual today that may be able accessible since our last adjustment.  Pt verbalized understanding.   Risk, benefits, and alternatives were reviewed before a consent was signed. Pt wishes to proceed. Pt will follow up as needed for subsequent assessment.     PROCEDURE:  Adjustment of gastric band      PROCEDURE DETAILS: In the clinic exam room, the patient was placed in supine position on the exam table. The area over the access port was prepped with an alcohol swab, gloves were donned, and a Vicente needle and syringe were directed into the port under palpation guidance. A small amount of saline was aspirated to verify location, and all the remaining fluid (around 1 ml) was removed into the port. Access needle was withdrawn and bandaid was applied. Pt should return to a liquid diet and advance as tolerated.     She will monitor her symptoms for the next 2 weeks.  If she continues to have trouble with solids she will call clinic and I will discuss next options with Dr. Celeste. Pt was comfortable with plan and left home in a stable and ambulatory condition.      I spent a total of 15 minutes face to face with Mary during today's office visit. Over 50% of this time was spent counseling the patient and/or coordinating care.

## 2018-04-07 ENCOUNTER — OFFICE VISIT (OUTPATIENT)
Dept: URGENT CARE | Facility: URGENT CARE | Age: 38
End: 2018-04-07
Payer: COMMERCIAL

## 2018-04-07 VITALS
RESPIRATION RATE: 16 BRPM | SYSTOLIC BLOOD PRESSURE: 118 MMHG | DIASTOLIC BLOOD PRESSURE: 72 MMHG | TEMPERATURE: 99.1 F | OXYGEN SATURATION: 96 % | HEART RATE: 100 BPM

## 2018-04-07 DIAGNOSIS — J01.00 ACUTE NON-RECURRENT MAXILLARY SINUSITIS: ICD-10-CM

## 2018-04-07 DIAGNOSIS — J21.9 ACUTE BRONCHIOLITIS DUE TO UNSPECIFIED ORGANISM: Primary | ICD-10-CM

## 2018-04-07 LAB
DEPRECATED S PYO AG THROAT QL EIA: NORMAL
SPECIMEN SOURCE: NORMAL

## 2018-04-07 PROCEDURE — 87880 STREP A ASSAY W/OPTIC: CPT | Performed by: NURSE PRACTITIONER

## 2018-04-07 PROCEDURE — 87081 CULTURE SCREEN ONLY: CPT | Performed by: NURSE PRACTITIONER

## 2018-04-07 PROCEDURE — 99203 OFFICE O/P NEW LOW 30 MIN: CPT | Performed by: NURSE PRACTITIONER

## 2018-04-07 RX ORDER — GUAIFENESIN/DEXTROMETHORPHAN 100-10MG/5
5 SYRUP ORAL EVERY 4 HOURS PRN
Qty: 560 ML | Refills: 0 | Status: SHIPPED | OUTPATIENT
Start: 2018-04-07 | End: 2018-06-26

## 2018-04-07 RX ORDER — ALBUTEROL SULFATE 90 UG/1
2 AEROSOL, METERED RESPIRATORY (INHALATION) EVERY 6 HOURS PRN
Qty: 1 INHALER | Refills: 0 | Status: SHIPPED | OUTPATIENT
Start: 2018-04-07 | End: 2018-10-06

## 2018-04-07 ASSESSMENT — ENCOUNTER SYMPTOMS
SHORTNESS OF BREATH: 1
SORE THROAT: 1
HEADACHES: 1
COUGH: 1
SINUS PAIN: 1
CHILLS: 0
FATIGUE: 1
RHINORRHEA: 1
VOMITING: 0
SINUS PRESSURE: 1
FEVER: 0
ABDOMINAL PAIN: 0

## 2018-04-07 NOTE — NURSING NOTE
"Chief Complaint   Patient presents with     Urgent Care     Pt c/o nasal congestion, sinus pressure, sorethroat, cough, SOB, dizziness and dehyrated since Monday       Initial /72  Pulse 100  Temp 99.1  F (37.3  C)  Resp 16  SpO2 96% Estimated body mass index is 34.57 kg/(m^2) as calculated from the following:    Height as of 4/23/15: 5' 6\" (1.676 m).    Weight as of 3/30/18: 214 lb 3.2 oz (97.2 kg).  Medication Reconciliation: unable or not appropriate to perform    Enrique Estrella CMA  "

## 2018-04-07 NOTE — PROGRESS NOTES
SUBJECTIVE:   Mary Reyes is a 37 year old female presenting with a chief complaint of   Chief Complaint   Patient presents with     Urgent Care     Pt c/o nasal congestion, sinus pressure, sorethroat, cough, SOB, dizziness and dehyrated since Monday       She is an established patient of [facility].  Wednesday, worsening.  Onset of symptoms was 5 day(s) ago.  Course of illness is same.    Severity moderately severe  Current and Associated symptoms: stuffy nose, cough - productive, shortness of breath, sore throat, hoarse voice, facial pain/pressure, headache and fatigue  Treatment measures tried include Vaporizer, Fluids, Rest and Afrin, salt water gargles, steam.  Predisposing factors include None.    Patient is breastfeeding, 6 month old child at home. Reports decreased mild supply due to not being able to drink enough fluids.      Review of Systems   Constitutional: Positive for fatigue. Negative for chills and fever.   HENT: Positive for congestion, postnasal drip, rhinorrhea, sinus pain, sinus pressure and sore throat. Negative for ear pain.    Respiratory: Positive for cough and shortness of breath.    Gastrointestinal: Negative for abdominal pain and vomiting.   Neurological: Positive for headaches.       Past Medical History:   Diagnosis Date     PONV (postoperative nausea and vomiting)      No family history on file.  Current Outpatient Prescriptions   Medication Sig Dispense Refill     guaiFENesin-dextromethorphan (ROBITUSSIN DM) 100-10 MG/5ML syrup Take 5 mLs by mouth every 4 hours as needed for cough 560 mL 0     albuterol (PROAIR HFA/PROVENTIL HFA/VENTOLIN HFA) 108 (90 BASE) MCG/ACT Inhaler Inhale 2 puffs into the lungs every 6 hours as needed for shortness of breath / dyspnea or wheezing 1 Inhaler 0     IRON CR PO Take by mouth daily       Omega-3 Fatty Acids (FISH OIL CONCENTRATE PO) Take 2 capsules by mouth daily       Biotin 1 MG CAPS Take by mouth daily       Calcium Carbonate-Vitamin D (CALCIUM  + D PO) Take  by mouth. 1 PO each meal       Cyanocobalamin (VITAMIN B-12 SL) Place 2,000 mcg under the tongue daily.       ZOLPIDEM TARTRATE PO Take 10 mg by mouth nightly as needed.       Multiple Vitamin (MULTI-VITAMIN PO) Take  by mouth daily. 2 PO daily.        VITAMIN D, ERGOCALCIFEROL, PO Take 2,000 Int'l Units by mouth. 2 daily.        Social History   Substance Use Topics     Smoking status: Former Smoker     Packs/day: 0.50     Years: 10.00     Types: Cigarettes     Quit date: 1/1/2006     Smokeless tobacco: Never Used     Alcohol use No       OBJECTIVE  /72  Pulse 100  Temp 99.1  F (37.3  C)  Resp 16  SpO2 96%    Physical Exam   Constitutional: She appears ill.   HENT:   Head: Normocephalic.   Right Ear: Hearing, tympanic membrane, external ear and ear canal normal.   Left Ear: Hearing, tympanic membrane, external ear and ear canal normal.   Nose: Mucosal edema and rhinorrhea present. Right sinus exhibits maxillary sinus tenderness. Right sinus exhibits no frontal sinus tenderness. Left sinus exhibits maxillary sinus tenderness. Left sinus exhibits no frontal sinus tenderness.   Mouth/Throat: Oropharynx is clear and moist and mucous membranes are normal. Tonsils are 3+ on the right. Tonsils are 2+ on the left.   Eyes: Conjunctivae are normal.   Neck: Neck supple.   Cardiovascular: Normal rate, regular rhythm and normal heart sounds.    Pulmonary/Chest: Effort normal. She has wheezes.   Lymphadenopathy:     She has cervical adenopathy.   Neurological: She is alert.   Psychiatric: She has a normal mood and affect.       Labs:  Results for orders placed or performed in visit on 04/07/18 (from the past 24 hour(s))   Strep, Rapid Screen   Result Value Ref Range    Specimen Description Throat     Rapid Strep A Screen       NEGATIVE: No Group A streptococcal antigen detected by immunoassay, await culture report.       X-Ray was not done.    ASSESSMENT:      ICD-10-CM    1. Acute bronchiolitis due to  unspecified organism J21.9 Strep, Rapid Screen     guaiFENesin-dextromethorphan (ROBITUSSIN DM) 100-10 MG/5ML syrup     albuterol (PROAIR HFA/PROVENTIL HFA/VENTOLIN HFA) 108 (90 BASE) MCG/ACT Inhaler     Beta strep group A culture   2. Acute non-recurrent maxillary sinusitis J01.00         Medical Decision Making:    Differential Diagnosis:  URI Adult/Peds:  Bronchitis-viral, Influenza, Laryngitis, Sinusitis, Strep pharyngitis, Tonsilitis and Viral upper respiratory illness    Serious Comorbid Conditions:  Adult:  None. Treatment options limited due to lactation.    PLAN:    URI Adult:  Tylenol, Ibuprofen, Fluids, Rest, OTC cough suppressant/expectorant, OTC decongestant/antihistamine and Vaporizer    Followup:    If not improving or if condition worsens, follow up with your Primary Care Provider    Patient Instructions       Laryngitis    Laryngitis is a swelling of the tissues around the vocal cords. Symptoms include a hoarse (scratchy) voice. The voice may be lost completely. It may be caused by a viral illness, such as a head or chest cold. It may also be due to overuse and strain of the voice. Smoking, drinking alcohol, acid reflux, allergies, or inhaling harsh chemicals may also lead to symptoms. This condition will usually resolve in 1-2 weeks.  Home care    Rest your voice until it recovers. Talk as little as possible. If your symptoms are severe, rest at home for a day or so.    Breathing cool steam from a humidifier/vaporizer or in a steamy shower may be helpful.    Drink plenty of fluids to stay well hydrated.    Do not smoke  Follow-up care  Follow up with your healthcare provider or this facility if you have not improved after one week.  When to seek medical advice  Contact your healthcare provider for any of the following:    Severe pain with swallowing    Trouble opening mouth    Neck swelling, neck pain, or trouble moving neck    Noisy breathing or trouble breathing    Fever of 100.4 F (38. C) or  higher, or as directed by your healthcare provider    Drooling    Symptoms do not resolve in 2 weeks  Date Last Reviewed: 4/26/2015 2000-2017 [employer]. [address] [place] [address], [place] [address]. All rights reserved. This information is not intended as a substitute for professional medical care. Always follow your healthcare professional's instructions.        Acute Bronchitis  Your healthcare provider has told you that you have acute bronchitis. Bronchitis is infection or inflammation of the bronchial tubes (airways in the lungs). Normally, air moves easily in and out of the airways. Bronchitis narrows the airways, making it harder for air to flow in and out of the lungs. This causes symptoms such as shortness of breath, coughing up yellow or green mucus, and wheezing. Bronchitis can be acute or chronic. Acute means the condition comes on quickly and goes away in a short time, usually within 3 to 10 days. Chronic means a condition lasts a long time and often comes back.    What causes acute bronchitis?  Acute bronchitis almost always starts as a viral respiratory infection, such as a cold or the flu. Certain factors make it more likely for a cold or flu to turn into bronchitis. These include being very young, being elderly, having a heart or lung problem, or having a weak immune system. Cigarette smoking also makes bronchitis more likely.  When bronchitis develops, the airways become swollen. The airways may also become infected with bacteria. This is known as a secondary infection.  Diagnosing acute bronchitis  Your healthcare provider will examine you and ask about your symptoms and health history. You may also have a sputum culture to test the fluid in your lungs. Chest X-rays may be done to look for infection in the lungs.  Treating acute bronchitis  Bronchitis usually clears up as the cold or flu goes away. You can help feel better faster by doing the following:    Take medicine as directed.  You may be told to take ibuprofen or other over-the-counter medicines. These help relieve inflammation in your bronchial tubes. Your healthcare provider may prescribe an inhaler to help open up the bronchial tubes. Most of the time, acute bronchitis is caused by a viral infection. Antibiotics are usually not prescribed for viral infections.    Drink plenty of fluids, such as water, juice, or warm soup. Fluids loosen mucus so that you can cough it up. This helps you breathe more easily. Fluids also prevent dehydration.    Make sure you get plenty of rest.    Do not smoke. Do not allow anyone else to smoke in your home.  Recovery and follow-up  Follow up with your doctor as you are told. You will likely feel better in a week or two. But a dry cough can linger beyond that time. Let your doctor know if you still have symptoms (other than a dry cough) after 2 weeks, or if you re prone to getting bronchial infections. Take steps to protect yourself from future infections. These steps include stopping smoking and avoiding tobacco smoke, washing your hands often, and getting a yearly flu shot.  When to call your healthcare provider  Call the healthcare provider if you have any of the following:    Fever of 100.4 F (38.0 C) or higher, or as advised    Symptoms that get worse, or new symptoms    Trouble breathing    Symptoms that don t start to improve within a week, or within 3 days of taking antibiotics   Date Last Reviewed: 12/1/2016 2000-2017 [employer]. [address] [place] [address], [place] [address]. All rights reserved. This information is not intended as a substitute for professional medical care. Always follow your healthcare professional's instructions.

## 2018-04-07 NOTE — MR AVS SNAPSHOT
After Visit Summary   4/7/2018    Mary Reyes    MRN: [id number]           Patient Information     Date Of Birth          1980        Visit Information        Provider Department      4/7/2018 9:45 AM Sulema Laughlin NP [facility]        Today's Diagnoses     Acute bronchiolitis due to unspecified organism    -  1      Care Instructions      Laryngitis    Laryngitis is a swelling of the tissues around the vocal cords. Symptoms include a hoarse (scratchy) voice. The voice may be lost completely. It may be caused by a viral illness, such as a head or chest cold. It may also be due to overuse and strain of the voice. Smoking, drinking alcohol, acid reflux, allergies, or inhaling harsh chemicals may also lead to symptoms. This condition will usually resolve in 1-2 weeks.  Home care    Rest your voice until it recovers. Talk as little as possible. If your symptoms are severe, rest at home for a day or so.    Breathing cool steam from a humidifier/vaporizer or in a steamy shower may be helpful.    Drink plenty of fluids to stay well hydrated.    Do not smoke  Follow-up care  Follow up with your healthcare provider or this facility if you have not improved after one week.  When to seek medical advice  Contact your healthcare provider for any of the following:    Severe pain with swallowing    Trouble opening mouth    Neck swelling, neck pain, or trouble moving neck    Noisy breathing or trouble breathing    Fever of 100.4 F (38. C) or higher, or as directed by your healthcare provider    Drooling    Symptoms do not resolve in 2 weeks  Date Last Reviewed: 4/26/2015 2000-2017 [employer]. [address] [place] [address], [place] [address]. All rights reserved. This information is not intended as a substitute for professional medical care. Always follow your healthcare professional's instructions.        Acute Bronchitis  Your healthcare provider has told you  that you have acute bronchitis. Bronchitis is infection or inflammation of the bronchial tubes (airways in the lungs). Normally, air moves easily in and out of the airways. Bronchitis narrows the airways, making it harder for air to flow in and out of the lungs. This causes symptoms such as shortness of breath, coughing up yellow or green mucus, and wheezing. Bronchitis can be acute or chronic. Acute means the condition comes on quickly and goes away in a short time, usually within 3 to 10 days. Chronic means a condition lasts a long time and often comes back.    What causes acute bronchitis?  Acute bronchitis almost always starts as a viral respiratory infection, such as a cold or the flu. Certain factors make it more likely for a cold or flu to turn into bronchitis. These include being very young, being elderly, having a heart or lung problem, or having a weak immune system. Cigarette smoking also makes bronchitis more likely.  When bronchitis develops, the airways become swollen. The airways may also become infected with bacteria. This is known as a secondary infection.  Diagnosing acute bronchitis  Your healthcare provider will examine you and ask about your symptoms and health history. You may also have a sputum culture to test the fluid in your lungs. Chest X-rays may be done to look for infection in the lungs.  Treating acute bronchitis  Bronchitis usually clears up as the cold or flu goes away. You can help feel better faster by doing the following:    Take medicine as directed. You may be told to take ibuprofen or other over-the-counter medicines. These help relieve inflammation in your bronchial tubes. Your healthcare provider may prescribe an inhaler to help open up the bronchial tubes. Most of the time, acute bronchitis is caused by a viral infection. Antibiotics are usually not prescribed for viral infections.    Drink plenty of fluids, such as water, juice, or warm soup. Fluids loosen mucus so that you  can cough it up. This helps you breathe more easily. Fluids also prevent dehydration.    Make sure you get plenty of rest.    Do not smoke. Do not allow anyone else to smoke in your home.  Recovery and follow-up  Follow up with your doctor as you are told. You will likely feel better in a week or two. But a dry cough can linger beyond that time. Let your doctor know if you still have symptoms (other than a dry cough) after 2 weeks, or if you re prone to getting bronchial infections. Take steps to protect yourself from future infections. These steps include stopping smoking and avoiding tobacco smoke, washing your hands often, and getting a yearly flu shot.  When to call your healthcare provider  Call the healthcare provider if you have any of the following:    Fever of 100.4 F (38.0 C) or higher, or as advised    Symptoms that get worse, or new symptoms    Trouble breathing    Symptoms that don t start to improve within a week, or within 3 days of taking antibiotics   Date Last Reviewed: 12/1/2016 2000-2017 [employer]. [address] [place] [address]. All rights reserved. This information is not intended as a substitute for professional medical care. Always follow your healthcare professional's instructions.                Follow-ups after your visit        Who to contact     If you have questions or need follow up information about today's clinic visit or your schedule please contact [facility] directly at [phone].  Normal or non-critical lab and imaging results will be communicated to you by MyChart, letter or phone within 4 business days after the clinic has received the results. If you do not hear from us within 7 days, please contact the clinic through [employer] or phone. If you have a critical or abnormal lab result, we will notify you by phone as soon as possible.  Submit refill requests through [facility] or call your pharmacy and they will forward the refill  "request to us. Please allow 3 business days for your refill to be completed.          Additional Information About Your Visit        [employer] Information     [employer] lets you send messages to your doctor, view your test results, renew your prescriptions, schedule appointments and more. To sign up, go to [email or web address] . Click on \"Log in\" on the left side of the screen, which will take you to the Welcome page. Then click on \"Sign up Now\" on the right side of the page.     You will be asked to enter the access code listed below, as well as some personal information. Please follow the directions to create your username and password.     Your access code is: [email or web address]  Expires: 2018 10:20 AM     Your access code will  in 90 days. If you need help or a new code, please call your [facility] clinic or [phone].        Care EveryWhere ID     This is your Care EveryWhere ID. This could be used by other organizations to access your [facility] medical records  [id number]        Your Vitals Were     Pulse Temperature Respirations Pulse Oximetry          100 99.1  F (37.3  C) 16 96%         Blood Pressure from Last 3 Encounters:   18 118/72   18 117/70   18 118/78    Weight from Last 3 Encounters:   18 214 lb 3.2 oz (97.2 kg)   18 207 lb 12.8 oz (94.3 kg)   17 217 lb (98.4 kg)              We Performed the Following     Strep, Rapid Screen          Today's Medication Changes          These changes are accurate as of 18 11:34 AM.  If you have any questions, ask your nurse or doctor.               Start taking these medicines.        Dose/Directions    albuterol 108 (90 BASE) MCG/ACT Inhaler   Commonly known as:  PROAIR HFA/PROVENTIL HFA/VENTOLIN HFA   Used for:  Acute bronchiolitis due to unspecified organism   Started by:  Sulema Laughlin, NP        Dose:  2 puff   Inhale 2 puffs into the lungs every 6 hours as needed for shortness of breath / dyspnea " or wheezing   Quantity:  1 Inhaler   Refills:  0       guaiFENesin-dextromethorphan 100-10 MG/5ML syrup   Commonly known as:  ROBITUSSIN DM   Used for:  Acute bronchiolitis due to unspecified organism   Started by:  Sulema Laughlin NP        Dose:  5 mL   Take 5 mLs by mouth every 4 hours as needed for cough   Quantity:  560 mL   Refills:  0            Where to get your medicines      These medications were sent to [facility] [address] [place] [address] [id number] [place] [address] AT [place] [address]  [address] [place] [id number]    Hours:  24-hours Phone:  [phone]     albuterol 108 (90 BASE) MCG/ACT Inhaler    guaiFENesin-dextromethorphan 100-10 MG/5ML syrup                Primary Care Provider Fax #    [facility] [phone]       [address]  [facility] [address]        Equal Access to Services     [place] AH: Hadii fran ku hadasho Soomaali, waaxda luqadaha, qaybta kaalmada adeegyada, waxay jimin hayviraln janell joel . So [facility] [phone].    ATENCIÓN: Si habla español, tiene a cobian disposición servicios gratuitos de asistencia lingüística. Llame al [phone].    We comply with applicable federal civil rights laws and [place] laws. We do not discriminate on the basis of race, color, national origin, age, disability, sex, sexual orientation, or gender identity.            Thank you!     Thank you for choosing [facility]  for your care. Our goal is always to provide you with excellent care. Hearing back from our patients is one way we can continue to improve our services. Please take a few minutes to complete the written survey that you may receive in the mail after your visit with us. Thank you!             Your Updated Medication List - Protect others around you: Learn how to safely use, store and throw away your medicines at www.disposemymeds.org.          This list is accurate as of 4/7/18 11:34 AM.   Always use your most recent med list.                   Brand Name Dispense Instructions for use Diagnosis    albuterol 108 (90 BASE) MCG/ACT Inhaler    PROAIR HFA/PROVENTIL HFA/VENTOLIN HFA    1 Inhaler    Inhale 2 puffs into the lungs every 6 hours as needed for shortness of breath / dyspnea or wheezing    Acute bronchiolitis due to unspecified organism       Biotin 1 MG Caps      Take by mouth daily        CALCIUM + D PO      Take  by mouth. 1 PO each meal        FISH OIL CONCENTRATE PO      Take 2 capsules by mouth daily        guaiFENesin-dextromethorphan 100-10 MG/5ML syrup    ROBITUSSIN DM    560 mL    Take 5 mLs by mouth every 4 hours as needed for cough    Acute bronchiolitis due to unspecified organism       IRON CR PO      Take by mouth daily        MULTI-VITAMIN PO      Take  by mouth daily. 2 PO daily.        VITAMIN B-12 SL      Place 2,000 mcg under the tongue daily.        VITAMIN D (ERGOCALCIFEROL) PO      Take 2,000 Int'l Units by mouth. 2 daily.        ZOLPIDEM TARTRATE PO      Take 10 mg by mouth nightly as needed.

## 2018-04-07 NOTE — PATIENT INSTRUCTIONS
Laryngitis    Laryngitis is a swelling of the tissues around the vocal cords. Symptoms include a hoarse (scratchy) voice. The voice may be lost completely. It may be caused by a viral illness, such as a head or chest cold. It may also be due to overuse and strain of the voice. Smoking, drinking alcohol, acid reflux, allergies, or inhaling harsh chemicals may also lead to symptoms. This condition will usually resolve in 1-2 weeks.  Home care    Rest your voice until it recovers. Talk as little as possible. If your symptoms are severe, rest at home for a day or so.    Breathing cool steam from a humidifier/vaporizer or in a steamy shower may be helpful.    Drink plenty of fluids to stay well hydrated.    Do not smoke  Follow-up care  Follow up with your healthcare provider or this facility if you have not improved after one week.  When to seek medical advice  Contact your healthcare provider for any of the following:    Severe pain with swallowing    Trouble opening mouth    Neck swelling, neck pain, or trouble moving neck    Noisy breathing or trouble breathing    Fever of 100.4 F (38. C) or higher, or as directed by your healthcare provider    Drooling    Symptoms do not resolve in 2 weeks  Date Last Reviewed: 4/26/2015 2000-2017 [employer]. [address] [place] [address], [place] [address]. All rights reserved. This information is not intended as a substitute for professional medical care. Always follow your healthcare professional's instructions.        Acute Bronchitis  Your healthcare provider has told you that you have acute bronchitis. Bronchitis is infection or inflammation of the bronchial tubes (airways in the lungs). Normally, air moves easily in and out of the airways. Bronchitis narrows the airways, making it harder for air to flow in and out of the lungs. This causes symptoms such as shortness of breath, coughing up yellow or green mucus, and wheezing. Bronchitis can be acute or chronic.  Acute means the condition comes on quickly and goes away in a short time, usually within 3 to 10 days. Chronic means a condition lasts a long time and often comes back.    What causes acute bronchitis?  Acute bronchitis almost always starts as a viral respiratory infection, such as a cold or the flu. Certain factors make it more likely for a cold or flu to turn into bronchitis. These include being very young, being elderly, having a heart or lung problem, or having a weak immune system. Cigarette smoking also makes bronchitis more likely.  When bronchitis develops, the airways become swollen. The airways may also become infected with bacteria. This is known as a secondary infection.  Diagnosing acute bronchitis  Your healthcare provider will examine you and ask about your symptoms and health history. You may also have a sputum culture to test the fluid in your lungs. Chest X-rays may be done to look for infection in the lungs.  Treating acute bronchitis  Bronchitis usually clears up as the cold or flu goes away. You can help feel better faster by doing the following:    Take medicine as directed. You may be told to take ibuprofen or other over-the-counter medicines. These help relieve inflammation in your bronchial tubes. Your healthcare provider may prescribe an inhaler to help open up the bronchial tubes. Most of the time, acute bronchitis is caused by a viral infection. Antibiotics are usually not prescribed for viral infections.    Drink plenty of fluids, such as water, juice, or warm soup. Fluids loosen mucus so that you can cough it up. This helps you breathe more easily. Fluids also prevent dehydration.    Make sure you get plenty of rest.    Do not smoke. Do not allow anyone else to smoke in your home.  Recovery and follow-up  Follow up with your doctor as you are told. You will likely feel better in a week or two. But a dry cough can linger beyond that time. Let your doctor know if you still have symptoms  (other than a dry cough) after 2 weeks, or if you re prone to getting bronchial infections. Take steps to protect yourself from future infections. These steps include stopping smoking and avoiding tobacco smoke, washing your hands often, and getting a yearly flu shot.  When to call your healthcare provider  Call the healthcare provider if you have any of the following:    Fever of 100.4 F (38.0 C) or higher, or as advised    Symptoms that get worse, or new symptoms    Trouble breathing    Symptoms that don t start to improve within a week, or within 3 days of taking antibiotics   Date Last Reviewed: 12/1/2016 2000-2017 [employer]. [address] [place] [address], [place] [address]. All rights reserved. This information is not intended as a substitute for professional medical care. Always follow your healthcare professional's instructions.

## 2018-04-08 LAB
BACTERIA SPEC CULT: NORMAL
SPECIMEN SOURCE: NORMAL

## 2018-05-25 ENCOUNTER — OFFICE VISIT (OUTPATIENT)
Dept: PODIATRY | Facility: CLINIC | Age: 38
End: 2018-05-25
Payer: COMMERCIAL

## 2018-05-25 VITALS
BODY MASS INDEX: 34.55 KG/M2 | WEIGHT: 215 LBS | SYSTOLIC BLOOD PRESSURE: 116 MMHG | DIASTOLIC BLOOD PRESSURE: 68 MMHG | HEIGHT: 66 IN

## 2018-05-25 DIAGNOSIS — M72.2 PLANTAR FASCIAL FIBROMATOSIS: Primary | ICD-10-CM

## 2018-05-25 PROCEDURE — 99213 OFFICE O/P EST LOW 20 MIN: CPT | Performed by: PODIATRIST

## 2018-05-25 RX ORDER — DEXAMETHASONE SODIUM PHOSPHATE 4 MG/ML
INJECTION, SOLUTION INTRA-ARTICULAR; INTRALESIONAL; INTRAMUSCULAR; INTRAVENOUS; SOFT TISSUE
Qty: 30 ML | Refills: 0 | Status: SHIPPED | OUTPATIENT
Start: 2018-05-25 | End: 2018-10-06

## 2018-05-25 NOTE — MR AVS SNAPSHOT
After Visit Summary   5/25/2018    Mary Reyes    MRN: [id number]           Patient Information     Date Of Birth          1980        Visit Information        Provider Department      5/25/2018 10:15 AM Adam Cannon DPM [facility]        Today's Diagnoses     Plantar fascial fibromatosis    -  1       Follow-ups after your visit        Additional Services     [facility] PT, HAND, AND CHIROPRACTIC REFERRAL       === This order will print in the [facility] Scheduling  Office ===    Physical therapy, hand therapy and chiropractic care are available through:    [facility]  [facility]    Call one easy number to schedule at any of the above locations:  [phone].    Your provider has referred you to Physical Therapy at [facility] or [facility]    Indication/Reason for Referral: chronic left heel pain at plantar fascial insertion  Onset of Illness:  months  Therapy Orders:  Evaluate and Treat  Special Programs:  None  Special Request:  Equipment: As Indicated:   Exercise: Active/Assistive ROM, Conditioning, Home Exercise Program, Passive ROM, Posture/Body Mechanics, Progressive Strengthening and Stretching/Flexibility  Manual Therapy: Joint Mobilization and Myofascial Release/Massage  Modalities: As Indicated: , Iontophoresis (Please Order: Dexamethasone Sodium Phosphate - 4mg/ml injectable, 30 cc total volume) and Ultrasound    Additional Comments for the therapist or chiropractor:  8 sessions.    Please be aware that coverage of these services is subject to the terms and limitations of your health insurance plan.  Call member services at your health plan with any benefit or coverage questions.      Please bring the following to your appointment:    >>   Your personal calendar for scheduling future appointments  >>   Comfortable clothing                  Who to contact     If you have questions or need follow up information about  "today's clinic visit or your schedule please contact [facility] directly at [phone].  Normal or non-critical lab and imaging results will be communicated to you by MyChart, letter or phone within 4 business days after the clinic has received the results. If you do not hear from us within 7 days, please contact the clinic through [employer] or phone. If you have a critical or abnormal lab result, we will notify you by phone as soon as possible.  Submit refill requests through [employer] or call your pharmacy and they will forward the refill request to us. Please allow 3 business days for your refill to be completed.          Additional Information About Your Visit        [employer] Information     [employer] lets you send messages to your doctor, view your test results, renew your prescriptions, schedule appointments and more. To sign up, go to [email or web address] . Click on \"Log in\" on the left side of the screen, which will take you to the Welcome page. Then click on \"Sign up Now\" on the right side of the page.     You will be asked to enter the access code listed below, as well as some personal information. Please follow the directions to create your username and password.     Your access code is: [email or web address]  Expires: 2018 10:20 AM     Your access code will  in 90 days. If you need help or a new code, please call your [place] clinic or [phone].        Care EveryWhere ID     This is your Care EveryWhere ID. This could be used by other organizations to access your [facility] medical records  [id number]        Your Vitals Were     Height BMI (Body Mass Index)                5' 6\" (1.676 m) 34.7 kg/m2           Blood Pressure from Last 3 Encounters:   18 116/68   18 118/72   18 117/70    Weight from Last 3 Encounters:   18 215 lb (97.5 kg)   18 214 lb 3.2 oz (97.2 kg)   18 207 lb 12.8 oz (94.3 kg)              We Performed the Following     [facility] PT, " HAND, AND CHIROPRACTIC REFERRAL          Today's Medication Changes          These changes are accurate as of 5/25/18 11:12 AM.  If you have any questions, ask your nurse or doctor.               Start taking these medicines.        Dose/Directions    dexamethasone 4 MG/ML injection   Commonly known as:  DECADRON   Used for:  Plantar fascial fibromatosis   Started by:  Adam Cannon DPM        To be used by therapist during PT sessions.   Quantity:  30 mL   Refills:  0       order for DME   Used for:  Plantar fascial fibromatosis   Started by:  Adam Cannon DPM        Equipment being ordered: night splint   Quantity:  1 Device   Refills:  0            Where to get your medicines      These medications were sent to [facility] [address] [place] [address] [id number] [place] [id number]     Phone:  [phone]     dexamethasone 4 MG/ML injection         Some of these will need a paper prescription and others can be bought over the counter.  Ask your nurse if you have questions.     Bring a paper prescription for each of these medications     order for DME                Primary Care Provider Fax #    [facility] [phone]       [address]  [facility] [address]        Equal Access to Services     [place] : Bijal Suarez, waaxda luqadaha, qaybta kaalmada aderahdayada, waqar iraheta. So [facility] [phone].    ATENCIÓN: Si habla español, tiene a cobian disposición servicios gratuitos de asistencia lingüística. Chen al [phone].    We comply with applicable federal civil rights laws and [place] laws. We do not discriminate on the basis of race, color, national origin, age, disability, sex, sexual orientation, or gender identity.            Thank you!     Thank you for choosing [facility]  for your care. Our goal is always to provide you  with excellent care. Hearing back from our patients is one way we can continue to improve our services. Please take a few minutes to complete the written survey that you may receive in the mail after your visit with us. Thank you!             Your Updated Medication List - Protect others around you: Learn how to safely use, store and throw away your medicines at www.disposemymeds.org.          This list is accurate as of 5/25/18 11:12 AM.  Always use your most recent med list.                   Brand Name Dispense Instructions for use Diagnosis    albuterol 108 (90 Base) MCG/ACT Inhaler    PROAIR HFA/PROVENTIL HFA/VENTOLIN HFA    1 Inhaler    Inhale 2 puffs into the lungs every 6 hours as needed for shortness of breath / dyspnea or wheezing    Acute bronchiolitis due to unspecified organism       Biotin 1 MG Caps      Take by mouth daily        CALCIUM + D PO      Take  by mouth. 1 PO each meal        dexamethasone 4 MG/ML injection    DECADRON    30 mL    To be used by therapist during PT sessions.    Plantar fascial fibromatosis       FISH OIL CONCENTRATE PO      Take 2 capsules by mouth daily        guaiFENesin-dextromethorphan 100-10 MG/5ML syrup    ROBITUSSIN DM    560 mL    Take 5 mLs by mouth every 4 hours as needed for cough    Acute bronchiolitis due to unspecified organism       IRON CR PO      Take by mouth daily        MULTI-VITAMIN PO      Take  by mouth daily. 2 PO daily.        order for DME     1 Device    Equipment being ordered: night splint    Plantar fascial fibromatosis       VITAMIN B-12 SL      Place 2,000 mcg under the tongue daily.        VITAMIN D (ERGOCALCIFEROL) PO      Take 2,000 Int'l Units by mouth. 2 daily.        ZOLPIDEM TARTRATE PO      Take 10 mg by mouth nightly as needed.

## 2018-05-25 NOTE — PROGRESS NOTES
"Foot & Ankle Surgery   May 25, 2018    S:  Pt is seen today for evaluation of left heel pain. She had her 2nd steroid injection for plantar fasciitis, but this did not last as long as the last injection.  Initially 3 1/2 months of relief from the first shot, this time she had < 2 months.  She has morning pain but also significant pain towards the end of the day.  She's very limited regarding activities.    Vitals:    05/25/18 1013   Weight: 215 lb (97.5 kg)   Height: 5' 6\" (1.676 m)   '      ROS - Pos for CC.  Patient denies current nausea, vomiting, chills, fevers, belly pain, calf pain, chest pain or SOB.  Complete remainder of ROS it otherwise neg.      PE:  Gen:   No apparent distress  Eye:    Visual scanning without deficit  Ear:    Response to auditory stimuli wnl  Lung:    Non-labored breathing on RA noted  Abd:    NTND per patient report  Lymph:    Neg for pitting/non-pitting edema BLE  Vasc:    Pulses palpable, CFT minimally delayed  Neuro:    Light touch sensation intact to all sensory nerve distributions without paresthesias  Derm:    Neg for nodules, lesions or ulcerations  MSK:    left lower extremity - pain at plantar-medial heel . No tibial nerve, ICN, calcaeus pain noted  Calf:    Neg for redness, swelling or tenderness    Assessment:  37 year old female with plantar fasciitis left lower extremity       Plan:  Discussed etiologies, anatomy and options  1.  Plantar fasciitis left lower extremity   -continue all tier 1 therapies; reviewed  -discussed tier 2 options.  She has been in a walking boot before, would like to avoid this.    -DME order for night splint  -[facility]AN[facility]NA PT referral  -consider MRI if no improvement noted    Follow up:  After PT or sooner with acute issues      Body mass index is 34.7 kg/(m^2).  Weight management plan: Patient was referred to their PCP to discuss a diet and exercise plan.         Adam Cannon DPM   Podiatric Foot & Ankle Surgeon  [facility]  [phone]

## 2018-05-31 ENCOUNTER — THERAPY VISIT (OUTPATIENT)
Dept: PHYSICAL THERAPY | Facility: CLINIC | Age: 38
End: 2018-05-31
Attending: PODIATRIST
Payer: COMMERCIAL

## 2018-05-31 DIAGNOSIS — M72.2 PLANTAR FASCIITIS: Primary | ICD-10-CM

## 2018-05-31 PROCEDURE — 97110 THERAPEUTIC EXERCISES: CPT | Mod: GP | Performed by: PHYSICAL THERAPIST

## 2018-05-31 PROCEDURE — 97033 APP MDLTY 1+IONTPHRSIS EA 15: CPT | Mod: GP | Performed by: PHYSICAL THERAPIST

## 2018-05-31 PROCEDURE — 97161 PT EVAL LOW COMPLEX 20 MIN: CPT | Mod: GP | Performed by: PHYSICAL THERAPIST

## 2018-05-31 NOTE — MR AVS SNAPSHOT
After Visit Summary   5/31/2018    Mary Reyes    MRN: [id number]           Patient Information     Date Of Birth          1980        Visit Information        Provider Department      5/31/2018 4:30 PM Germán Sandoval, PT [facility] Physical Therapy        Today's Diagnoses     Plantar fasciitis    -  1       Follow-ups after your visit        Your next 10 appointments already scheduled     Jun 05, 2018  7:30 AM CDT   VANDANA Extremity with Maki Griffin PT   [facility] ([facility])    [address]  [facility] [address]   [phone]            Jun 08, 2018  4:40 PM CDT   VANDANA Extremity with Maki Griffin PT   [facility] ([facility])    [address]  [facility] [address]   [phone]            Jun 11, 2018  5:20 PM CDT   VANDANA Extremity with Maki Griffin, PT   [facility] ([facility])    [address]  [facility] [address]   [phone]            Jun 13, 2018  4:00 PM CDT   VANDANA Extremity with Maki Griffin PT   [facility] ([facility])    [address]  [facility] [address]   [phone]            Jun 18, 2018  5:20 PM CDT   VANDANA Extremity with Maki Griffin PT   [facility] [facility] ([facility])    [address]  [facility] [address]   [phone]            Jun 22, 2018  4:40 PM CDT   VANDANA Extremity with Maki Griffin PT   [facility]    [address]  [facility] [address] [phone]              Who to contact     If you have questions or need follow up information about today's clinic visit or your schedule  "please contact [facility] directly at [phone].  Normal or non-critical lab and imaging results will be communicated to you by MyChart, letter or phone within 4 business days after the clinic has received the results. If you do not hear from us within 7 days, please contact the clinic through [employer] or phone. If you have a critical or abnormal lab result, we will notify you by phone as soon as possible.  Submit refill requests through [employer] or call your pharmacy and they will forward the refill request to us. Please allow 3 business days for your refill to be completed.          Additional Information About Your Visit        [employer] Information     [employer] lets you send messages to your doctor, view your test results, renew your prescriptions, schedule appointments and more. To sign up, go to [email or web address] . Click on \"Log in\" on the left side of the screen, which will take you to the Welcome page. Then click on \"Sign up Now\" on the right side of the page.     You will be asked to enter the access code listed below, as well as some personal information. Please follow the directions to create your username and password.     Your access code is: [email or web address]  Expires: 2018 10:20 AM     Your access code will  in 90 days. If you need help or a new code, please call your [place] clinic or [phone].        Care EveryWhere ID     This is your Care EveryWhere ID. This could be used by other organizations to access your [place] medical records  [id number]         Blood Pressure from Last 3 Encounters:   18 116/68   18 118/72   18 117/70    Weight from Last 3 Encounters:   18 97.5 kg (215 lb)   18 97.2 kg (214 lb 3.2 oz)   18 94.3 kg (207 lb 12.8 oz)              We Performed the Following     ELECTRIC CURRENT THERAPY     HC PT EVAL, LOW COMPLEXITY     VANDANA INITIAL EVAL REPORT     THERAPEUTIC EXERCISES  "       Primary Care Provider Fax #     [phone]       [address]  [facility] [address]        Equal Access to Services     [place] : Hadii aad ku hadgurvinderkimmy Suarez, nava reyes, ayoartur luuarieterri macielfrediterri, waxbailey jimin hayaailiana finchradha darielpatotaryn iraheta. So [facility] [phone].    ATENCIÓN: Si habla español, tiene a cobian disposición servicios gratuitos de asistencia lingüística. Chen al [phone].    We comply with applicable federal civil rights laws and [place] laws. We do not discriminate on the basis of race, color, national origin, age, disability, sex, sexual orientation, or gender identity.            Thank you!     Thank you for choosing [place] [facility]YSICAL [facility]ERAP[facility]  for your care. Our goal is always to provide you with excellent care. Hearing back from our patients is one way we can continue to improve our services. Please take a few minutes to complete the written survey that you may receive in the mail after your visit with us. Thank you!             Your Updated Medication List - Protect others around you: Learn how to safely use, store and throw away your medicines at www.disposemymeds.org.          This list is accurate as of 5/31/18  5:14 PM.  Always use your most recent med list.                   Brand Name Dispense Instructions for use Diagnosis    albuterol 108 (90 Base) MCG/ACT Inhaler    PROAIR HFA/PROVENTIL HFA/VENTOLIN HFA    1 Inhaler    Inhale 2 puffs into the lungs every 6 hours as needed for shortness of breath / dyspnea or wheezing    Acute bronchiolitis due to unspecified organism       Biotin 1 MG Caps      Take by mouth daily        CALCIUM + D PO      Take  by mouth. 1 PO each meal        dexamethasone 4 MG/ML injection    DECADRON    30 mL    To be used by therapist during PT sessions.    Plantar fascial fibromatosis       FISH OIL CONCENTRATE PO      Take 2 capsules by mouth daily         guaiFENesin-dextromethorphan 100-10 MG/5ML syrup    ROBITUSSIN DM    560 mL    Take 5 mLs by mouth every 4 hours as needed for cough    Acute bronchiolitis due to unspecified organism       IRON CR PO      Take by mouth daily        MULTI-VITAMIN PO      Take  by mouth daily. 2 PO daily.        order for DME     1 Device    Equipment being ordered: night splint    Plantar fascial fibromatosis       VITAMIN B-12 SL      Place 2,000 mcg under the tongue daily.        VITAMIN D (ERGOCALCIFEROL) PO      Take 2,000 Int'l Units by mouth. 2 daily.        ZOLPIDEM TARTRATE PO      Take 10 mg by mouth nightly as needed.

## 2018-05-31 NOTE — PROGRESS NOTES
[facility] Initial Evaluation  Subjective:  Patient is a 37 year old female presenting with rehab left ankle/foot hpi. The history is provided by the patient. No  was used.   Mary Reyes is a 37 year old female with a left foot condition.  Condition occurred with:  Insidious onset.  Condition occurred: for unknown reasons.  This is a chronic condition  Pt reports having left heel pain that has been present for greater than 6 months.  She reports starting to run 3 months after giving birth and having plantar fascitis soon after.  MD appt on 5/25/18. .    Patient reports pain:  Medial calcaneal tuberosity.  Radiates to:  No radiation.  Pain is described as aching and is intermittent and reported as 4/10.  Associated symptoms:  Loss of motion/stiffness. Pain is worse during the day.  Symptoms are exacerbated by weight bearing (1st steps after rest) and relieved by ice.  Since onset symptoms are unchanged.  Special tests:  X-ray.  Previous treatment includes other (ionjections with short term relief).  There was mild improvement following previous treatment.  General health as reported by patient is good.  Pertinent medical history includes:  Overweight.  Medical allergies: no.  Other surgeries include:  None reported.  Current medications:  None as reported by patient.  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  Patient is working in normal job without restrictions.  Primary job tasks include:  Prolonged sitting.    Barriers include:  None as reported by patient.    Red flags:  None as reported by patient.                        Objective:        Flexibility/Screens:       Lower Extremity:      Decreased right lower extremity flexibility:  Gastroc and Soleus          Ankle/Foot Evaluation  ROM:  AROM is normal.PROM is normal.            PALPATION:   Left ankle tenderness present at:  plantar fascia and medial calcaneal  Left ankle tenderness not present at:    gastroc/soleus or achilles tendon    Right ankle tenderness not present at:  gastroc/soleus; achilles tendon; plantar fascia or medial calcaneal  EDEMA: normal          MOBILITY TESTING: normal                                                                General     ROS    Assessment/Plan:    Patient is a 37 year old female with left side ankle complaints.    Patient has the following significant findings with corresponding treatment plan.                Diagnosis 1:  L plantar fascitis  Pain -  self management, education, directional preference exercise and home program  Decreased ROM/flexibility - manual therapy and therapeutic exercise  Impaired muscle performance - neuro re-education  Decreased function - therapeutic activities    Therapy Evaluation Codes:   1) History comprised of:   Personal factors that impact the plan of care:      None.    Comorbidity factors that impact the plan of care are:      Overweight.     Medications impacting care: None.  2) Examination of Body Systems comprised of:   Body structures and functions that impact the plan of care:      Ankle.   Activity limitations that impact the plan of care are:      Standing and Walking.  3) Clinical presentation characteristics are:   Stable/Uncomplicated.  4) Decision-Making    Low complexity using standardized patient assessment instrument and/or measureable assessment of functional outcome.  Cumulative Therapy Evaluation is: Low complexity.    Previous and current functional limitations:  (See Goal Flow Sheet for this information)    Short term and Long term goals: (See Goal Flow Sheet for this information)     Communication ability:  Patient appears to be able to clearly communicate and understand verbal and written communication and follow directions correctly.  Treatment Explanation - The following has been discussed with the patient:   RX ordered/plan of care  Anticipated outcomes  Possible risks and side effects  This patient would  benefit from PT intervention to resume normal activities.   Rehab potential is good.    Frequency:  2 X week, once daily  Duration:  for 5 weeks  Discharge Plan:  Achieve all LTG.  Independent in home treatment program.  Reach maximal therapeutic benefit.    Please refer to the daily flowsheet for treatment today, total treatment time and time spent performing 1:1 timed codes.

## 2018-06-05 ENCOUNTER — THERAPY VISIT (OUTPATIENT)
Dept: PHYSICAL THERAPY | Facility: CLINIC | Age: 38
End: 2018-06-05
Payer: COMMERCIAL

## 2018-06-05 DIAGNOSIS — M72.2 PLANTAR FASCIITIS: ICD-10-CM

## 2018-06-05 PROCEDURE — 97110 THERAPEUTIC EXERCISES: CPT | Mod: GP | Performed by: PHYSICAL THERAPIST

## 2018-06-05 PROCEDURE — 97033 APP MDLTY 1+IONTPHRSIS EA 15: CPT | Mod: GP | Performed by: PHYSICAL THERAPIST

## 2018-06-05 PROCEDURE — 97140 MANUAL THERAPY 1/> REGIONS: CPT | Mod: GP | Performed by: PHYSICAL THERAPIST

## 2018-06-11 ENCOUNTER — THERAPY VISIT (OUTPATIENT)
Dept: PHYSICAL THERAPY | Facility: CLINIC | Age: 38
End: 2018-06-11
Payer: COMMERCIAL

## 2018-06-11 DIAGNOSIS — M72.2 PLANTAR FASCIITIS: ICD-10-CM

## 2018-06-11 PROCEDURE — 97140 MANUAL THERAPY 1/> REGIONS: CPT | Mod: GP | Performed by: PHYSICAL THERAPIST

## 2018-06-11 PROCEDURE — 97033 APP MDLTY 1+IONTPHRSIS EA 15: CPT | Mod: GP | Performed by: PHYSICAL THERAPIST

## 2018-06-11 PROCEDURE — 97110 THERAPEUTIC EXERCISES: CPT | Mod: GP | Performed by: PHYSICAL THERAPIST

## 2018-06-11 NOTE — MR AVS SNAPSHOT
After Visit Summary   6/11/2018    Mary Reyes    MRN: [id number]           Patient Information     Date Of Birth          1980        Visit Information        Provider Department      6/11/2018 5:20 PM Maki Griffin, KRYSTEN [facility]        Today's Diagnoses     Plantar fasciitis           Follow-ups after your visit        Your next 10 appointments already scheduled     Jun 13, 2018  4:00 PM CDT   VANDANA Extremity with Maki Griffin PT   [facility] Physical Therapy ([facility])    [address]  [facility] [address]   [phone]            Jun 18, 2018  5:20 PM CDT   VANDANA Extremity with Maki Griffin PT   [facility] Therapy ([facility])    [address]  [facility] [id number] [phone]            Jun 22, 2018  4:40 PM CDT   VANDANA Extremity with Maki Griffin PT   [facility] Physical Therapy ([facility])    [address]  [facility] [address]   [phone]              Who to contact     If you have questions or need follow up information about today's clinic visit or your schedule please contact [facility] PHYSICAL THERAPY directly at [phone].  Normal or non-critical lab and imaging results will be communicated to you by MyChart, letter or phone within 4 business days after the clinic has received the results. If you do not hear from us within 7 days, please contact the clinic through MyChart or phone. If you have a critical or abnormal lab result, we will notify you by phone as soon as possible.  Submit refill requests through [employer] or call your pharmacy and they will forward the refill request to us. Please allow 3 business days for your refill to be completed.          Additional Information About Your Visit       "  MyChart Information     [employer] lets you send messages to your doctor, view your test results, renew your prescriptions, schedule appointments and more. To sign up, go to [email or web address] . Click on \"Log in\" on the left side of the screen, which will take you to the Welcome page. Then click on \"Sign up Now\" on the right side of the page.     You will be asked to enter the access code listed below, as well as some personal information. Please follow the directions to create your username and password.     Your access code is: [email or web address]  Expires: 2018 10:20 AM     Your access code will  in 90 days. If you need help or a new code, please call your [place] clinic or [phone].        Care EveryWhere ID     This is your Care EveryWhere ID. This could be used by other organizations to access your [place] medical records  [id number]         Blood Pressure from Last 3 Encounters:   18 116/68   18 118/72   18 117/70    Weight from Last 3 Encounters:   18 97.5 kg (215 lb)   18 97.2 kg (214 lb 3.2 oz)   18 94.3 kg (207 lb 12.8 oz)              We Performed the Following     ELECTRIC CURRENT THERAPY     MANUAL THER TECH,1+REGIONS,EA 15 MIN     THERAPEUTIC EXERCISES        Primary Care Provider Fax #    [facility] [phone]       [address] [facility] [address]        Equal Access to Services     [place] : Hadii aad ku hadasho Soomaali, waaxda luqadaha, qaybta kaalmada adeegyada, waxay chinmay joel . So [facility] [phone].    ATENCIÓN: Si habla español, tiene a cobian disposición servicios gratuitos de asistencia lingüística. Llame al [phone].    We comply with applicable federal civil rights laws and [place] laws. We do not discriminate on the basis of race, color, national origin, age, disability, sex, sexual orientation, or gender identity.            Thank you!     Thank you for choosing " [place] [facility] PHYSICAL THERAPY  for your care. Our goal is always to provide you with excellent care. Hearing back from our patients is one way we can continue to improve our services. Please take a few minutes to complete the written survey that you may receive in the mail after your visit with us. Thank you!             Your Updated Medication List - Protect others around you: Learn how to safely use, store and throw away your medicines at www.disposemymeds.org.          This list is accurate as of 6/11/18  5:38 PM.  Always use your most recent med list.                   Brand Name Dispense Instructions for use Diagnosis    albuterol 108 (90 Base) MCG/ACT Inhaler    PROAIR HFA/PROVENTIL HFA/VENTOLIN HFA    1 Inhaler    Inhale 2 puffs into the lungs every 6 hours as needed for shortness of breath / dyspnea or wheezing    Acute bronchiolitis due to unspecified organism       Biotin 1 MG Caps      Take by mouth daily        CALCIUM + D PO      Take  by mouth. 1 PO each meal        dexamethasone 4 MG/ML injection    DECADRON    30 mL    To be used by therapist during PT sessions.    Plantar fascial fibromatosis       FISH OIL CONCENTRATE PO      Take 2 capsules by mouth daily        guaiFENesin-dextromethorphan 100-10 MG/5ML syrup    ROBITUSSIN DM    560 mL    Take 5 mLs by mouth every 4 hours as needed for cough    Acute bronchiolitis due to unspecified organism       IRON CR PO      Take by mouth daily        MULTI-VITAMIN PO      Take  by mouth daily. 2 PO daily.        order for DME     1 Device    Equipment being ordered: night splint    Plantar fascial fibromatosis       VITAMIN B-12 SL      Place 2,000 mcg under the tongue daily.        VITAMIN D (ERGOCALCIFEROL) PO      Take 2,000 Int'l Units by mouth. 2 daily.        ZOLPIDEM TARTRATE PO      Take 10 mg by mouth nightly as needed.

## 2018-06-11 NOTE — PROGRESS NOTES
Subjective:  HPI                    Objective:  System    Physical Exam    General     ROS    Assessment/Plan:    SUBJECTIVE  Subjective changes as noted by pt:   Felt good for a short time after the last treatment with massage. No overall change in pain.    Current pain level: 2/10   Changes in function:  None     Adverse reaction to treatment or activity:  None    OBJECTIVE  Changes in objective findings:  Yes, mild to moderate L plantar fascia fibrosis. Taping arch for support. Wearing tennis shoes today. Active L DF 18 deg.. passive great toe extension 80 deg.      ASSESSMENT  Mary continues to require intervention to meet STG and LTG's: PT  Patient is progressing as expected.  Response to therapy has shown an improvement in  flexibility  Progress made towards STG/LTG?  None    PLAN  Continue current treatment plan until patient demonstrates readiness to progress to higher level exercises.    PTA/ATC plan:  N/A    Please refer to the daily flowsheet for treatment today, total treatment time and time spent performing 1:1 timed codes.

## 2018-06-13 ENCOUNTER — THERAPY VISIT (OUTPATIENT)
Dept: PHYSICAL THERAPY | Facility: CLINIC | Age: 38
End: 2018-06-13
Payer: COMMERCIAL

## 2018-06-13 DIAGNOSIS — M72.2 PLANTAR FASCIITIS: ICD-10-CM

## 2018-06-13 PROCEDURE — 97033 APP MDLTY 1+IONTPHRSIS EA 15: CPT | Mod: GP | Performed by: PHYSICAL THERAPIST

## 2018-06-13 PROCEDURE — 97035 APP MDLTY 1+ULTRASOUND EA 15: CPT | Mod: GP | Performed by: PHYSICAL THERAPIST

## 2018-06-13 PROCEDURE — 97140 MANUAL THERAPY 1/> REGIONS: CPT | Mod: GP | Performed by: PHYSICAL THERAPIST

## 2018-06-18 ENCOUNTER — THERAPY VISIT (OUTPATIENT)
Dept: PHYSICAL THERAPY | Facility: CLINIC | Age: 38
End: 2018-06-18
Payer: COMMERCIAL

## 2018-06-18 DIAGNOSIS — M72.2 PLANTAR FASCIITIS: ICD-10-CM

## 2018-06-18 PROCEDURE — 97140 MANUAL THERAPY 1/> REGIONS: CPT | Mod: GP | Performed by: PHYSICAL THERAPIST

## 2018-06-18 PROCEDURE — 97035 APP MDLTY 1+ULTRASOUND EA 15: CPT | Mod: GP | Performed by: PHYSICAL THERAPIST

## 2018-06-18 PROCEDURE — 97033 APP MDLTY 1+IONTPHRSIS EA 15: CPT | Mod: GP | Performed by: PHYSICAL THERAPIST

## 2018-06-22 ENCOUNTER — THERAPY VISIT (OUTPATIENT)
Dept: PHYSICAL THERAPY | Facility: CLINIC | Age: 38
End: 2018-06-22
Payer: COMMERCIAL

## 2018-06-22 DIAGNOSIS — M72.2 PLANTAR FASCIITIS: ICD-10-CM

## 2018-06-22 PROCEDURE — 97035 APP MDLTY 1+ULTRASOUND EA 15: CPT | Mod: GP | Performed by: PHYSICAL THERAPIST

## 2018-06-22 PROCEDURE — 97140 MANUAL THERAPY 1/> REGIONS: CPT | Mod: GP | Performed by: PHYSICAL THERAPIST

## 2018-06-22 PROCEDURE — 97033 APP MDLTY 1+IONTPHRSIS EA 15: CPT | Mod: GP | Performed by: PHYSICAL THERAPIST

## 2018-06-22 NOTE — MR AVS SNAPSHOT
"              After Visit Summary   2018    Mary Reyes    MRN: [id number]           Patient Information     Date Of Birth          1980        Visit Information        Provider Department      2018 4:40 PM Maki Griffin PT [facility] Physical [facility]        Today's Diagnoses     Plantar fasciitis           Follow-ups after your visit        Who to contact     If you have questions or need follow up information about today's clinic visit or your schedule please contact [facility]THLETIC [facility] PHYSICAL [facility] directly at [phone].  Normal or non-critical lab and imaging results will be communicated to you by [employer], letter or phone within 4 business days after the clinic has received the results. If you do not hear from us within 7 days, please contact the clinic through [employer] or phone. If you have a critical or abnormal lab result, we will notify you by phone as soon as possible.  Submit refill requests through [employer] or call your pharmacy and they will forward the refill request to us. Please allow 3 business days for your refill to be completed.          Additional Information About Your Visit        MyChart Information     [employer] lets you send messages to your doctor, view your test results, renew your prescriptions, schedule appointments and more. To sign up, go to [email or web address] . Click on \"Log in\" on the left side of the screen, which will take you to the Welcome page. Then click on \"Sign up Now\" on the right side of the page.     You will be asked to enter the access code listed below, as well as some personal information. Please follow the directions to create your username and password.     Your access code is: [email or web address]  Expires: 2018 10:20 AM     Your access code will  in 90 days. If you need help or a new code, please call your [place] clinic or [phone].        Care EveryWhere ID     This " is your Care EveryWhere ID. This could be used by other organizations to access your [place] medical records  [id number]         Blood Pressure from Last 3 Encounters:   05/25/18 116/68   04/07/18 118/72   03/30/18 117/70    Weight from Last 3 Encounters:   05/25/18 97.5 kg (215 lb)   03/30/18 97.2 kg (214 lb 3.2 oz)   03/30/18 94.3 kg (207 lb 12.8 oz)              We Performed the Following     ELECTRIC CURRENT THERAPY     MANUAL THER TECH,1+REGIONS,EA 15 MIN     ULTRASOUND THERAPY        Primary Care Provider Fax #    [facility] [phone]       [address] [facility] [address]        Equal Access to Services     [place] : Bijal Suarez, nava reyes, patti angulo, waqar iraheta. So [facility] [phone].    ATENCIÓN: Si habla español, tiene a cobian disposición servicios gratuitos de asistencia lingüística. Llame al [phone].    We comply with applicable federal civil rights laws and [place] laws. We do not discriminate on the basis of race, color, national origin, age, disability, sex, sexual orientation, or gender identity.            Thank you!     Thank you for choosing [facility] PHYSICAL THERAP[facility]  for your care. Our goal is always to provide you with excellent care. Hearing back from our patients is one way we can continue to improve our services. Please take a few minutes to complete the written survey that you may receive in the mail after your visit with us. Thank you!             Your Updated Medication List - Protect others around you: Learn how to safely use, store and throw away your medicines at www.disposemymeds.org.          This list is accurate as of 6/22/18  5:48 PM.  Always use your most recent med list.                   Brand Name Dispense Instructions for use Diagnosis    albuterol 108 (90 Base) MCG/ACT Inhaler    PROAIR HFA/PROVENTIL HFA/VENTOLIN HFA     1 Inhaler    Inhale 2 puffs into the lungs every 6 hours as needed for shortness of breath / dyspnea or wheezing    Acute bronchiolitis due to unspecified organism       Biotin 1 MG Caps      Take by mouth daily        CALCIUM + D PO      Take  by mouth. 1 PO each meal        dexamethasone 4 MG/ML injection    DECADRON    30 mL    To be used by therapist during PT sessions.    Plantar fascial fibromatosis       FISH OIL CONCENTRATE PO      Take 2 capsules by mouth daily        guaiFENesin-dextromethorphan 100-10 MG/5ML syrup    ROBITUSSIN DM    560 mL    Take 5 mLs by mouth every 4 hours as needed for cough    Acute bronchiolitis due to unspecified organism       IRON CR PO      Take by mouth daily        MULTI-VITAMIN PO      Take  by mouth daily. 2 PO daily.        order for DME     1 Device    Equipment being ordered: night splint    Plantar fascial fibromatosis       VITAMIN B-12 SL      Place 2,000 mcg under the tongue daily.        VITAMIN D (ERGOCALCIFEROL) PO      Take 2,000 Int'l Units by mouth. 2 daily.        ZOLPIDEM TARTRATE PO      Take 10 mg by mouth nightly as needed.

## 2018-06-26 ENCOUNTER — TELEPHONE (OUTPATIENT)
Dept: PALLIATIVE MEDICINE | Facility: CLINIC | Age: 38
End: 2018-06-26

## 2018-06-26 ENCOUNTER — OFFICE VISIT (OUTPATIENT)
Dept: FAMILY MEDICINE | Facility: CLINIC | Age: 38
End: 2018-06-26
Payer: COMMERCIAL

## 2018-06-26 VITALS
HEIGHT: 66 IN | BODY MASS INDEX: 34.87 KG/M2 | HEART RATE: 72 BPM | OXYGEN SATURATION: 97 % | RESPIRATION RATE: 16 BRPM | WEIGHT: 217 LBS | SYSTOLIC BLOOD PRESSURE: 116 MMHG | TEMPERATURE: 98.6 F | DIASTOLIC BLOOD PRESSURE: 80 MMHG

## 2018-06-26 DIAGNOSIS — M54.12 CERVICAL RADICULOPATHY: Primary | ICD-10-CM

## 2018-06-26 PROCEDURE — 99213 OFFICE O/P EST LOW 20 MIN: CPT | Performed by: NURSE PRACTITIONER

## 2018-06-26 NOTE — MR AVS SNAPSHOT
After Visit Summary   6/26/2018    Mary Reyes    MRN: [id number]           Patient Information     Date Of Birth          1980        Visit Information        Provider Department      6/26/2018 3:40 PM Martha Casey APRN [facility]        Today's Diagnoses     Cervical radiculopathy    -  1       Follow-ups after your visit        Additional Services     ORTHO  REFERRAL       [facility] is referring you to the Orthopedic  Services at [facility].       The  Representative will assist you in the coordination of your Orthopedic and Musculoskeletal Care as prescribed by your physician.    The  Representative will call you within 1 business day to help schedule your appointment, or you may contact the  Representative at:    All areas ~ [phone]     Type of Referral : Non Surgical  Pain Interventionist; patient would like a steroid injection       Timeframe requested: 1 - 2 days    Coverage of these services is subject to the terms and limitations of your health insurance plan.  Please call member services at your health plan with any benefit or coverage questions.      If X-rays, CT or MRI's have been performed, please contact the facility where they were done to arrange for , prior to your scheduled appointment.  Please bring this referral request to your appointment and present it to your specialist.                  Your next 10 appointments already scheduled     Jul 02, 2018  4:50 PM CDT   VANDANA Extremity with Jose Luis Camilo PT   [place] [facility] ([facility])    [address]  [facility] [address]   [phone]            Jul 05, 2018  3:40 PM CDT   VANDANA Extremity with Jose Luis Camilo PT   [place] [facility] Physical Therapy ([facility])    [address]  [facility] [id number] [phone]              Who to contact     If you have questions or need follow up information about today's clinic visit or your schedule please contact [facility] directly at [phone].  Normal or non-critical lab and imaging results will be communicated to you by MyChart, letter or phone within 4 business days after the clinic has received the results. If you do not hear from us within 7 days, please contact the clinic through MyChart or phone. If you have a critical or abnormal lab result, we will notify you by phone as soon as possible.  Submit refill requests through [employer] or call your pharmacy and they will forward the refill request to us. Please allow 3 business days for your refill to be completed.          Additional Information About Your Visit        MyCharRaise Information     [employer] lets you send messages to your doctor, view your test results, renew your prescriptions, schedule appointments and more. To sign up, go to [email or web address] . Click on \"Log in\" on the left side of the screen, which will take you to the Welcome page. Then click on \"Sign up Now\" on the right side of the page.     You will be asked to enter the access code listed below, as well as some personal information. Please follow the directions to create your username and password.     Your access code is: [email or web address]  Expires: 2018  3:35 PM     Your access code will  in 90 days. If you need help or a new code, please call your [facility] or [phone].        Care EveryWhere ID     This is your Care EveryWhere ID. This could be used by other organizations to access your [place] medical records  [id number]        Your Vitals Were     Pulse Temperature Respirations Height Pulse Oximetry BMI (Body Mass Index)    72 98.6  F (37  C) (Oral) 16 5' 6\" (1.676 m) 97% 35.02 kg/m2       Blood Pressure from Last 3 Encounters:   18 116/80   18 116/68 "   04/07/18 118/72    Weight from Last 3 Encounters:   06/26/18 217 lb (98.4 kg)   05/25/18 215 lb (97.5 kg)   03/30/18 214 lb 3.2 oz (97.2 kg)              We Performed the Following     ORTHO  REFERRAL        Primary Care Provider Fax #    [facility] [phone]       [address]  [facility] [address]        Equal Access to Services     [place] : Hadii aad ku hadasho Soomaali, waaxda luqadaha, qaybta kaalmada adeegyada, waxay idiin hayaan adeeg krishtaryn laarielle iraheta. So [facility] [phone].    ATENCIÓN: Si habla espjavier, tiene a cobian disposición servicios gratuitos de asistencia lingüística. Llame al [phone].    We comply with applicable federal civil rights laws and [place] laws. We do not discriminate on the basis of race, color, national origin, age, disability, sex, sexual orientation, or gender identity.            Thank you!     Thank you for choosing [facility]  for your care. Our goal is always to provide you with excellent care. Hearing back from our patients is one way we can continue to improve our services. Please take a few minutes to complete the written survey that you may receive in the mail after your visit with us. Thank you!             Your Updated Medication List - Protect others around you: Learn how to safely use, store and throw away your medicines at www.disposemymeds.org.          This list is accurate as of 6/26/18  4:10 PM.  Always use your most recent med list.                   Brand Name Dispense Instructions for use Diagnosis    albuterol 108 (90 Base) MCG/ACT Inhaler    PROAIR HFA/PROVENTIL HFA/VENTOLIN HFA    1 Inhaler    Inhale 2 puffs into the lungs every 6 hours as needed for shortness of breath / dyspnea or wheezing    Acute bronchiolitis due to unspecified organism       Biotin 1 MG Caps      Take by mouth daily        CALCIUM + D PO      Take  by mouth. 1 PO each meal        dexamethasone 4 MG/ML injection     DECADRON    30 mL    To be used by therapist during PT sessions.    Plantar fascial fibromatosis       FISH OIL CONCENTRATE PO      Take 2 capsules by mouth daily        IRON CR PO      Take by mouth daily        MULTI-VITAMIN PO      Take  by mouth daily. 2 PO daily.        order for DME     1 Device    Equipment being ordered: night splint    Plantar fascial fibromatosis       VITAMIN B-12 SL      Place 2,000 mcg under the tongue daily.        VITAMIN D (ERGOCALCIFEROL) PO      Take 2,000 Int'l Units by mouth. 2 daily.        ZOLPIDEM TARTRATE PO      Take 10 mg by mouth nightly as needed.

## 2018-06-26 NOTE — TELEPHONE ENCOUNTER
Pt calling to schedule ANTONETTE.   Pt informed we would need to check coverage on her [employer] insurance.   She states she wanted to pay out of pocket for procedure in the meantime.   Pt also states she does not have recent MRI/CT scan done within the past 3 years and informed of how far locations are booked out.   She started crying and states she was in pain now and needs an injection ASAP.   She will contact referring provider.           Dasha MADISON    [facility] [place]

## 2018-06-26 NOTE — PROGRESS NOTES
"  SUBJECTIVE:   Mary Reyes is a 37 year old female who presents to clinic today for the following health issues:    Neck Pain  Onset: Ongoing for Two Days    Description:   Location: Left Back Side of Neck  Radiation: Radiation of Pain down the Left Arm/Shoulder Blade to the Left Pinky    Intensity: Moderate Pain    Progression of Symptoms:  worsening    Accompanying Signs & Symptoms:  Burning, prickly sensation (paresthesias) in arm(s): YES-Left Arm  Numbness in arm(s): YES-Left Arm  Weakness in arm(s):  no   Fever: no   Headache: no   Nausea and/or vomiting: no     History:   Trauma: no   Previous neck pain: YES- Similar Pain happened Two Months Ago  Previous surgery or injections: no   Previous Imaging (MRI,X ray): no     Precipitating factors:   Does movement increase the pain:  YES    Alleviating factors:  Nothing    Therapies Tried and outcome:  Massage and Chiropractic care have been tried and only helped very temporarily.     Two months ago had similar pain.  Did massage and chiropractor at that time.  Gave her temporary relief.  Pain took about 3 weeks to fully resolve.  Pain returned 2 days ago.  Thought she had just \"slept funny\", but has progressively worsened.        Problem list and histories reviewed & adjusted, as indicated.  Additional history: as documented    Current Outpatient Prescriptions   Medication Sig Dispense Refill     Biotin 1 MG CAPS Take by mouth daily       Calcium Carbonate-Vitamin D (CALCIUM + D PO) Take  by mouth. 1 PO each meal       Cyanocobalamin (VITAMIN B-12 SL) Place 2,000 mcg under the tongue daily.       dexamethasone (DECADRON) 4 MG/ML injection To be used by therapist during PT sessions. 30 mL 0     Multiple Vitamin (MULTI-VITAMIN PO) Take  by mouth daily. 2 PO daily.        VITAMIN D, ERGOCALCIFEROL, PO Take 2,000 Int'l Units by mouth. 2 daily.        albuterol (PROAIR HFA/PROVENTIL HFA/VENTOLIN HFA) 108 (90 BASE) MCG/ACT Inhaler Inhale 2 puffs into the lungs every " "6 hours as needed for shortness of breath / dyspnea or wheezing (Patient not taking: Reported on 6/26/2018) 1 Inhaler 0     IRON CR PO Take by mouth daily       Omega-3 Fatty Acids (FISH OIL CONCENTRATE PO) Take 2 capsules by mouth daily       order for DME Equipment being ordered: night splint (Patient not taking: Reported on 6/26/2018) 1 Device 0     ZOLPIDEM TARTRATE PO Take 10 mg by mouth nightly as needed.       No Known Allergies    Reviewed and updated as needed this visit by clinical staff  Tobacco  Allergies  Meds  Med Hx  Surg Hx  Fam Hx  Soc Hx      Reviewed and updated as needed this visit by Provider         ROS:  MUSCULOSKELETAL: POSITIVE  for neck pain  NEURO: POSITIVE for numbness or tingling  and NEGATIVE for weakness     OBJECTIVE:     /80 (BP Location: Right arm, Patient Position: Chair, Cuff Size: Adult Regular)  Pulse 72  Temp 98.6  F (37  C) (Oral)  Resp 16  Ht 5' 6\" (1.676 m)  Wt 217 lb (98.4 kg)  SpO2 97%  BMI 35.02 kg/m2  Body mass index is 35.02 kg/(m^2).  GENERAL: healthy, alert and no distress  MS: neck: no cervical spinous tenderness, L upper back and lateral neck tightness and tenderness, limited ROM secondary to pain, upper extremity strength and DTRs normal, L arm with FROM, negative impingement signs  SKIN: no suspicious lesions or rashes      Diagnostic Test Results:  none     ASSESSMENT/PLAN:   1. Cervical radiculopathy  Discussed course of oral steroids.  She much prefers to do a steroid injection.  I considered muscle relaxors, but she is breastfeeding and these are not advised.  Will have her see ortho for possible injection.  Rest, heat, NSAIDs.    - ORTHO  REFERRAL        SNEHA Eugene [facility]    "

## 2018-06-27 ENCOUNTER — TELEPHONE (OUTPATIENT)
Dept: FAMILY MEDICINE | Facility: CLINIC | Age: 38
End: 2018-06-27

## 2018-06-27 DIAGNOSIS — M54.12 CERVICAL RADICULOPATHY: Primary | ICD-10-CM

## 2018-06-27 NOTE — TELEPHONE ENCOUNTER
Spoke with patient.  She states that she was in so much pain and so upset with the pain clinic that she went to the [facility] [address] in [place].  She was given an injection of Prednisone and Toradol and sent home on oral Prednisone.  States she is working with them now to get in sooner with someone else for an injection.  Advised that we did order the MRI.  Patient is going to see where they are in their process.  She states that she will NOT go back to the pain clinic.    Kenia Alfredo RN      Order for MRI placed.  I put in for today though it looks like they told her they were booked out, so not sure if this will be able to get done today.               Martha Casey CNP                     ----- Message -----          From: Kenia Alfredo RN          Sent: 6/27/2018   8:44 AM            To: SNEHA Barker CNP       Subject: FW: Imaging orders                                                       ----- Message -----          From: Jairo Mesa          Sent: 6/26/2018   4:35 PM            To:  Triage Pool       Subject: Imaging orders                                             Transferred patient over to [place] [facility] to schedule for an injection (patient declined NS since she is in so much pain she states she can wait for a consult and another appointment for injection)  Pain management is requiring prior imaging to be completed before patient can schedule with them. They also told patient CDI may be another option, advised patient to check with insurance regarding CDI. Please help patient with imaging orders or other recommendations for treatment.                Jairo Flaherty Orthopedic  Coordinator

## 2018-07-27 ENCOUNTER — OFFICE VISIT (OUTPATIENT)
Dept: PODIATRY | Facility: CLINIC | Age: 38
End: 2018-07-27
Payer: COMMERCIAL

## 2018-07-27 VITALS
DIASTOLIC BLOOD PRESSURE: 60 MMHG | BODY MASS INDEX: 34.87 KG/M2 | SYSTOLIC BLOOD PRESSURE: 118 MMHG | WEIGHT: 217 LBS | HEIGHT: 66 IN

## 2018-07-27 DIAGNOSIS — G57.91 NEURITIS OF RIGHT HEEL: ICD-10-CM

## 2018-07-27 DIAGNOSIS — M72.2 PLANTAR FASCIAL FIBROMATOSIS: Primary | ICD-10-CM

## 2018-07-27 PROCEDURE — 99213 OFFICE O/P EST LOW 20 MIN: CPT | Mod: 25 | Performed by: PODIATRIST

## 2018-07-27 PROCEDURE — 64450 NJX AA&/STRD OTHER PN/BRANCH: CPT | Performed by: PODIATRIST

## 2018-07-27 NOTE — MR AVS SNAPSHOT
After Visit Summary   7/27/2018    Mary Reyes    MRN: [id number]           Patient Information     Date Of Birth          1980        Visit Information        Provider Department      7/27/2018 9:45 AM Adam Blair DPM [facility]        Care Instructions    Thank you for choosing [facility] / [facility]!    DR. BLAIR'S CLINIC LOCATIONS:   MONDAY - [place] TUESDAY - [place]   [address] [place] [address]  [address]   [place] [address] [place] [address]   [phone]       THURSDAY AM - [place] THURSDAY PM - [place]   [address]   [place] [address] [place] [address]   [phone]       FRIDAY AM - [place] SET UP SURGERY: [phone] [address] APPOINTMENTS: [phone]   [place] [address] BILLING QUESTIONS: [phone] FAX NUMBER: [phone]     Follow Up:  1 to 2 weeks    Body Mass Index (BMI)  Many things can cause foot and ankle problems. Foot structure, activity level, foot mechanics and injuries are common causes of pain. One very important issue that often goes unmentioned, is body weight. Extra weight can cause increased stress on muscles, ligaments, bones and tendons. Sometimes just a few extra pounds is all it takes to put one over her/his threshold. Without reducing that stress, it can be difficult to alleviate pain. Some people are uncomfortable addressing this issue, but we feel it is important for you to think about it. As Foot &  Ankle specialists, our job is addressing the lower extremity problem and possible causes. Regarding extra body weight, we encourage patients to discuss diet and weight management plans with their primary care doctors. It is this team approach that gives you the best opportunity for pain relief and getting you back on your feet.            Follow-ups after your visit        Who to contact     If  "you have questions or need follow up information about today's clinic visit or your schedule please contact [facility] directly at [phone].  Normal or non-critical lab and imaging results will be communicated to you by MyChart, letter or phone within 4 business days after the clinic has received the results. If you do not hear from us within 7 days, please contact the clinic through [employer] or phone. If you have a critical or abnormal lab result, we will notify you by phone as soon as possible.  Submit refill requests through [employer] or call your pharmacy and they will forward the refill request to us. Please allow 3 business days for your refill to be completed.          Additional Information About Your Visit        [employer] Information     [employer] lets you send messages to your doctor, view your test results, renew your prescriptions, schedule appointments and more. To sign up, go to [email or web address] . Click on \"Log in\" on the left side of the screen, which will take you to the Welcome page. Then click on \"Sign up Now\" on the right side of the page.     You will be asked to enter the access code listed below, as well as some personal information. Please follow the directions to create your username and password.     Your access code is: [email or web address]  Expires: 2018  3:35 PM     Your access code will  in 90 days. If you need help or a new code, please call your [facility] clinic or [phone].        Care EveryWhere ID     This is your Care EveryWhere ID. This could be used by other organizations to access your [facility] medical records  [id number]        Your Vitals Were     Height BMI (Body Mass Index)                5' 6\" (1.676 m) 35.02 kg/m2           Blood Pressure from Last 3 Encounters:   18 118/60   18 116/80   18 116/68    Weight from Last 3 Encounters:   18 217 lb (98.4 kg)   18 217 lb (98.4 kg)   18 215 lb (97.5 kg)            "   Today, you had the following     No orders found for display       Primary Care Provider Fax #    [facility] [phone]       [address]  [facility] [address]        Equal Access to Services     [place]A [place] : Hadii aad ku hadgurvinderkimmy Suarez, nava magdalenaprashantha, patti kadoris angulo, waqar jimin hayaan josetteradha otto marycruz iraheta. So [facility] [phone].    ATENCIÓN: Si habla español, tiene a cobian disposición servicios gratuitos de asistencia lingüística. Llame al [phone].    We comply with applicable federal civil rights laws and [place] laws. We do not discriminate on the basis of race, color, national origin, age, disability, sex, sexual orientation, or gender identity.            Thank you!     Thank you for choosing [facility]  for your care. Our goal is always to provide you with excellent care. Hearing back from our patients is one way we can continue to improve our services. Please take a few minutes to complete the written survey that you may receive in the mail after your visit with us. Thank you!             Your Updated Medication List - Protect others around you: Learn how to safely use, store and throw away your medicines at www.disposemymeds.org.          This list is accurate as of 7/27/18 10:20 AM.  Always use your most recent med list.                   Brand Name Dispense Instructions for use Diagnosis    albuterol 108 (90 Base) MCG/ACT Inhaler    PROAIR HFA/PROVENTIL HFA/VENTOLIN HFA    1 Inhaler    Inhale 2 puffs into the lungs every 6 hours as needed for shortness of breath / dyspnea or wheezing    Acute bronchiolitis due to unspecified organism       Biotin 1 MG Caps      Take by mouth daily        CALCIUM + D PO      Take  by mouth. 1 PO each meal        dexamethasone 4 MG/ML injection    DECADRON    30 mL    To be used by therapist during PT sessions.    Plantar fascial fibromatosis       FISH OIL CONCENTRATE PO      Take 2 capsules by  mouth daily        IRON CR PO      Take by mouth daily        MULTI-VITAMIN PO      Take  by mouth daily. 2 PO daily.        order for DME     1 Device    Equipment being ordered: night splint    Plantar fascial fibromatosis       VITAMIN B-12 SL      Place 2,000 mcg under the tongue daily.        VITAMIN D (ERGOCALCIFEROL) PO      Take 2,000 Int'l Units by mouth. 2 daily.        ZOLPIDEM TARTRATE PO      Take 10 mg by mouth nightly as needed.

## 2018-07-27 NOTE — PATIENT INSTRUCTIONS
Thank you for choosing [facility]!    DR. BLAIR'S CLINIC LOCATIONS:   MONDAY - [place] TUESDAY - [place]   [address] [facility]  [address]   [place] [address] [place] [address]   [phone]       THURSDAY AM - [place] THURSDAY PM - [place]   [address] [facility] [address]   [place] [address] [place], [place] [address] [phone]       FRIDAY AM - [place] SET UP SURGERY: [phone] [address] APPOINTMENTS: [phone]   [place] [address] BILLING QUESTIONS: [phone] FAX NUMBER: [phone]     Follow Up:  1 to 2 weeks    Body Mass Index (BMI)  Many things can cause foot and ankle problems. Foot structure, activity level, foot mechanics and injuries are common causes of pain. One very important issue that often goes unmentioned, is body weight. Extra weight can cause increased stress on muscles, ligaments, bones and tendons. Sometimes just a few extra pounds is all it takes to put one over her/his threshold. Without reducing that stress, it can be difficult to alleviate pain. Some people are uncomfortable addressing this issue, but we feel it is important for you to think about it. As Foot &  Ankle specialists, our job is addressing the lower extremity problem and possible causes. Regarding extra body weight, we encourage patients to discuss diet and weight management plans with their primary care doctors. It is this team approach that gives you the best opportunity for pain relief and getting you back on your feet.

## 2018-07-27 NOTE — PROGRESS NOTES
Foot & Ankle Surgery   July 27, 2018    S:  Pt is seen today for evaluation of L heel pain.  Tier 1 therapies, most tier 2 thearpies, did PT without improvement.  She's also noticing some right heel/arch pain.      There were no vitals filed for this visit.'      ROS - Pos for CC.  Patient denies current nausea, vomiting, chills, fevers, belly pain, calf pain, chest pain or SOB.  Complete remainder of ROS it otherwise neg.      PE:  Gen:   No apparent distress  Eye:    Visual scanning without deficit  Ear:    Response to auditory stimuli wnl  Lung:    Non-labored breathing on RA noted  Abd:    NTND per patient report  Lymph:    Neg for pitting/non-pitting edema BLE  Vasc:    Pulses palpable, CFT minimally delayed  Neuro:    Light touch sensation intact to all sensory nerve distributions without paresthesias  Derm:    Neg for nodules, lesions or ulcerations  MSK:    Left lower extremity - pain at plantar heel consistent with PF without ICN, calcaneus or tibial nerve pain.  Right lower extremity - tenderness at distal geo pedis, minimal plantar or plantarmedial heel pain  Calf:    Neg for redness, swelling or tenderness    Assessment:  37 year old female with left lower extremity plantar fasciitis; right lower extremity neuritis       Plan:  Discussed etiologies, anatomy and options  1.  Left lower extremity plantar fasciitis   -she has exhausted tier 1 and tier 2 therapies without overall improvement  -MRI @ [facility], follow up 1-2 weeks to review results.  Discussed we're typically planning for surgery at this point based on MRI results    2.  Right lower extremity neuritis   -shoes, inserts, RICE/NSAID prn  -diagnostic/therapeutic injection, see procedure note.  Patient advised to monitor %, location, duration of improvement    After obtaining written consent, the skin was prepped with alcohol.  The needle was advance to the underlying Umana's nerve and inferior margin of tarsal tunnel, aspiration was done with  position change.  1 1/2cc mixture of 2:1 kenalog 40:0.25% marcaine plain was injected.  The patient tolerated the procedure without complication.  Risks that were discussed included possible joint/soft tissue damage, neuritis/numbness, infection, pigment change, steroid flare.       Follow up:  1-2 weeks or sooner with acute issues      There is no height or weight on file to calculate BMI.  Weight management plan: Patient was referred to their PCP to discuss a diet and exercise plan.         Adam Cannon DPM FACFAS FACFAOM  Podiatric Foot & Ankle Surgeon  [facility]  [phone]

## 2018-07-31 ENCOUNTER — HOSPITAL ENCOUNTER (OUTPATIENT)
Dept: MRI IMAGING | Facility: CLINIC | Age: 38
Discharge: HOME | End: 2018-07-31
Attending: PODIATRIST | Admitting: PODIATRIST
Payer: COMMERCIAL

## 2018-07-31 DIAGNOSIS — M72.2 PLANTAR FASCIAL FIBROMATOSIS: ICD-10-CM

## 2018-07-31 PROCEDURE — 73721 MRI JNT OF LWR EXTRE W/O DYE: CPT | Mod: LT

## 2018-08-06 ENCOUNTER — TELEPHONE (OUTPATIENT)
Dept: PODIATRY | Facility: CLINIC | Age: 38
End: 2018-08-06

## 2018-08-06 NOTE — TELEPHONE ENCOUNTER
Ph. [phone] able to leave message    Patient calling for results of her recent MRI    Thanks!    Faye Beasley

## 2018-08-10 ENCOUNTER — NURSE TRIAGE (OUTPATIENT)
Dept: NURSING | Facility: CLINIC | Age: 38
End: 2018-08-10

## 2018-08-10 ENCOUNTER — TELEPHONE (OUTPATIENT)
Dept: PODIATRY | Facility: CLINIC | Age: 38
End: 2018-08-10

## 2018-08-10 RX ORDER — TRIAMCINOLONE ACETONIDE 40 MG/ML
40 INJECTION, SUSPENSION INTRA-ARTICULAR; INTRAMUSCULAR ONCE
Qty: 1 ML | Refills: 0 | OUTPATIENT
Start: 2018-08-10 | End: 2018-08-10

## 2018-08-10 NOTE — TELEPHONE ENCOUNTER
Clinic Action Needed:  Please contact patient at [phone]; okay to leave detailed message at that number.  Reason for Call:  Patient requesting results of left foot imaging of 7/31/18, and what next step is; states she has not heard back from call of earlier in week.  Routed to:  Podiatry Pool

## 2018-08-10 NOTE — TELEPHONE ENCOUNTER
Patient requesting results of left foot imaging of 7/31/18, and what next step is; states she has not heard back from call of earlier in week.  FNA routed Podiatry Pool.

## 2018-08-13 ENCOUNTER — HEALTH MAINTENANCE LETTER (OUTPATIENT)
Age: 38
End: 2018-08-13

## 2018-08-13 ENCOUNTER — NURSE TRIAGE (OUTPATIENT)
Dept: NURSING | Facility: CLINIC | Age: 38
End: 2018-08-13

## 2018-08-13 NOTE — TELEPHONE ENCOUNTER
Clinic Action Needed: Please call back Pt to advise if surgery is needed .   Reason for Call:From  [phone] Pt called.    Calling for Results of MRI  left ankle  on 7/31/18  from   [facility] - Adam Cannon DPM  Clinic Action Needed:  Please contact patient at [phone]; okay to leave detailed message at that number.  Reason for Call:  Patient requesting results of left foot imaging of 7/31/18, and what next step is; states she has not heard back from call of earlier in week. And has been waiting for call back .   Patient Recommendations/Teaching: [facility] gave her googled contact info for Adam Cannon DPM  @ [address] [place] [address] Phone: [phone] and rerouted message to Podiatry .   .Charisma Ahmadi RN [facility] nurse advisors.

## 2018-08-13 NOTE — TELEPHONE ENCOUNTER
Please see notes below, patient looking for MRI results    Jasmyn Amaya MS ATC    Results for orders placed or performed during the hospital encounter of 07/31/18   MR Ankle Left w/o Contrast    Narrative    MR ANKLE LEFT WITHOUT CONTRAST  7/31/2018 6:58 PM    HISTORY: Plantar left heel pain. The concern is for plantar fasciitis.  The ordering doctor also states a concern for plantar fascial  fibromatosis.    COMPARISON: None.    TECHNIQUE: Multiplanar MR imaging was performed without contrast.    FINDINGS:    Osseous and Cartilaginous Structures: There is a small plantar  calcaneal spur. No fracture or osseous lesion is demonstrated. No  abnormal marrow signal intensity is identified. The cartilage surfaces  are well preserved. No talar dome osteochondral lesion.    Posterior Tibial and Flexor Tendons: No tear or significant tendinosis  of the posterior tibial tendon, flexor digitorum longus tendon, or  flexor hallucis longus tendon.     Peroneal Tendons: No tear or significant tendinosis of the peroneal  brevis tendon or peroneal longus tendon.     Achilles Tendon: No tear or significant tendinosis.     Extensor Tendons: No tear or significant tendinosis of the anterior  tibial tendon, extensor hallucis longus tendon, or extensor digitorum  longus tendon.     Lateral Ligaments: The anterior talofibular ligament is unremarkable.  The calcaneofibular, posterior talofibular, and anterior tibiofibular  ligaments are unremarkable.     Medial Deltoid Ligamentous Complex: Unremarkable.     Plantar Fascia: The visualized plantar fascia is intact. There is mild  thickening of the origin of the fibers adjacent to the calcaneus. No  significant abnormal signal is seen within the fascia itself. There is  prominent edema within the flexor digitorum brevis muscle just deep to  the fascia adjacent to the calcaneus. There is no focal mass within  the fascia to suggest fibromatosis.     Additional Findings: No significant  tibiotalar joint effusion. No  retrocalcaneal bursitis. No mass within the tarsal tunnel. The sinus  tarsi is unremarkable. No soft tissue abnormality is seen.      Impression    IMPRESSION:   1. There is prominent edema within the flexor digitorum brevis muscle  just deep to the origin of the plantar fascia. No definite muscle  fiber disruption is seen. This likely represents a prominent muscle  strain. Mild partial thickness tearing would be difficult to exclude.  2. Mild thickening of the plantar fascia origin. This may be related  to chronic plantar fasciitis. There is no increased signal within the  fascia to suggest active fasciitis.  3. No abnormal masses to suggest plantar fibromatosis.    CIRO YOUNGBLOOD MD

## 2018-08-13 NOTE — TELEPHONE ENCOUNTER
I called and spoke with Marina about theMRI results:    IMPRESSION:   1. There is prominent edema within the flexor digitorum brevis muscle  just deep to the origin of the plantar fascia. No definite muscle  fiber disruption is seen. This likely represents a prominent muscle  strain. Mild partial thickness tearing would be difficult to exclude.  2. Mild thickening of the plantar fascia origin. This may be related  to chronic plantar fasciitis. There is no increased signal within the  fascia to suggest active fasciitis.  3. No abnormal masses to suggest plantar fibromatosis.    Some plantar fasciosis but inflamed muscle belly seems to be most acute issues.    Recommendations:  1.  NWB, will get crutches, advised she call for a [employer] order if she would like one    2.  RICE/NSAID as aggressively as symptoms dictate    3.  Follow up 3 weeks.  Will do a tapered steroid dose at that time. She was recently on steroids for a herniated disc in her neck.  It didn't help her neck but her feet did feel better.    Adam Cannon DPM FACFAS FACFAOM  Podiatric Foot & Ankle Surgeon  [facility]  [phone]

## 2018-08-15 ENCOUNTER — TELEPHONE (OUTPATIENT)
Dept: PODIATRY | Facility: CLINIC | Age: 38
End: 2018-08-15

## 2018-08-15 NOTE — TELEPHONE ENCOUNTER
Patient said she was told to used crutches for 3 weeks but she cannot use then due to some nerve damage in her arm.   Please contact her and let her know what she should do.    Thank you!

## 2018-08-20 NOTE — TELEPHONE ENCOUNTER
Patient states that she is unable to use the scooter or crutches. Please call to discuss.     Home Phone [phone]   Mobile [phone]

## 2018-08-21 NOTE — TELEPHONE ENCOUNTER
I called and LVM for Marina, advised she call back in clinic to discuss issues with the scooter and crutches, as well as to discuss future treatment options.  I mentioned I will be in the OR tomorrow, so if i'm unable to call Wednesday, I will return her call Thursday    Adam Cannon DPM FACFAS FACFAOM  Podiatric Foot & Ankle Surgeon  [facility]  [phone]

## 2018-08-29 ENCOUNTER — TELEPHONE (OUTPATIENT)
Dept: PODIATRY | Facility: CLINIC | Age: 38
End: 2018-08-29

## 2018-08-29 NOTE — TELEPHONE ENCOUNTER
Request to place MRI of L ankle on a disc.    Disk was made, report printed and placed up front for patient to .    Debbie Palomo  [facility]  [phone]  Fax [phone]

## 2018-08-31 ENCOUNTER — MYC MEDICAL ADVICE (OUTPATIENT)
Dept: PODIATRY | Facility: CLINIC | Age: 38
End: 2018-08-31

## 2018-08-31 DIAGNOSIS — M79.673 FOOT ARCH PAIN, UNSPECIFIED LATERALITY: Primary | ICD-10-CM

## 2018-08-31 DIAGNOSIS — M79.673 HEEL PAIN, UNSPECIFIED LATERALITY: ICD-10-CM

## 2018-08-31 RX ORDER — PREDNISONE 5 MG/1
TABLET ORAL
Qty: 21 TABLET | Refills: 0 | Status: SHIPPED | OUTPATIENT
Start: 2018-08-31 | End: 2018-10-06

## 2018-08-31 NOTE — TELEPHONE ENCOUNTER
Please see Sweeperyt message. Patient frustrated with no return call.    Phone call to patient and apologized for the multiple attempts to reach us.     She states due to a cervical spine injury, she was not able to use crutches or the scooter.   She was not getting her calls returned, so she has been using the walking boot on her left foot.   She does not think it is helping and is making her right foot feel worse.   She states was to return in 3 weeks from her last appointment and then would be prescribed a week of prednisone to help with edema.  She has an appointment scheduled for 9/7/18 with Dr. Cannon.   She is wondering if he would want to send a prescription for prednisone to the pharmacy today and then see her next week at her appointment.   She doesn't feel that an appointment before hand is going to change anything since an MRI determined what was going on.   She also has difficulty getting off work.   Will discuss with Dr. Cannon and try to get back to her today.     Please advise. Pharmacy set up in [facility] if needed.     Routing to Dr. Cannon.     MONIKA Betancur RN

## 2018-08-31 NOTE — TELEPHONE ENCOUNTER
I spoke with Faye about Marina's concerns.  Rx for tapered prednisone dose sent to her pharmacy, will see her for follow up next week.    Adam Cannon DPM FACFAS FACFAOM  Podiatric Foot & Ankle Surgeon  [facility]  [phone]

## 2018-08-31 NOTE — TELEPHONE ENCOUNTER
This was addressed in previous mychart encounter dated today. Closing encounter.     MONIKA Betancur RN

## 2018-08-31 NOTE — TELEPHONE ENCOUNTER
Phone call to patient and informed prescription sent to pharmacy and she should follow up with Dr. Cannon at her appointment next week.   Informed of sig for Prednisone and possible side effects of insomnia and stomach upset. Advised she take with food and to start tomorrow.   Asked that if she had questions, to call back and triage number provided.  Informed that is the same number she should be calling when she has questions and messages can be forwarded to the provider.   Sent Telespree message also.     MONIKA Betancur RN

## 2018-08-31 NOTE — TELEPHONE ENCOUNTER
Phone call to patient.   She states she just got writer's voicemail and it was delayed on her phone for some reason.   Informed that Dr. Cannon also recommended that she weight bear as tolerated and use ice/NSAIDS as needed for her symptoms.   She states she has been doing that and will follow up at her appointment.   She was appreciative of assistance today.    MONIKA Betancur RN

## 2018-08-31 NOTE — TELEPHONE ENCOUNTER
Pt called back, given message below, will  rx, has appt made    Enriqueta Osman RN, BS  Clinical Nurse Triage.

## 2018-09-07 ENCOUNTER — OFFICE VISIT (OUTPATIENT)
Dept: PODIATRY | Facility: CLINIC | Age: 38
End: 2018-09-07
Payer: COMMERCIAL

## 2018-09-07 VITALS
SYSTOLIC BLOOD PRESSURE: 116 MMHG | BODY MASS INDEX: 34.87 KG/M2 | WEIGHT: 217 LBS | HEIGHT: 66 IN | DIASTOLIC BLOOD PRESSURE: 70 MMHG

## 2018-09-07 DIAGNOSIS — T14.8XXA MUSCLE STRAIN: Primary | ICD-10-CM

## 2018-09-07 DIAGNOSIS — M72.2 PLANTAR FASCIAL FIBROMATOSIS: ICD-10-CM

## 2018-09-07 PROCEDURE — 99213 OFFICE O/P EST LOW 20 MIN: CPT | Mod: 25 | Performed by: PODIATRIST

## 2018-09-07 PROCEDURE — 20550 NJX 1 TENDON SHEATH/LIGAMENT: CPT | Performed by: PODIATRIST

## 2018-09-07 ASSESSMENT — PAIN SCALES - GENERAL: PAINLEVEL: MILD PAIN (2)

## 2018-09-07 NOTE — PROGRESS NOTES
"Foot & Ankle Surgery   September 7, 2018    S:  Pt is seen today for evaluation of left heel pain.  She is quite frustrated with contined pain and limited improvement.  She continues to wear the boot with limited overall improvement.  She's been doing PT with limited overall improvement.  RICE/NSAID at home with limited improvement.  She has had 2 previous L heel steroid injections with diminishing improvement, last one 3/30/18.  She's also having issues with her neck and has had issues with the R heel, but this is ok for now.    Vitals:    09/07/18 1031   BP: 116/70   Weight: 217 lb (98.4 kg)   Height: 5' 6\" (1.676 m)   '      ROS - Pos for CC.  Patient denies current nausea, vomiting, chills, fevers, belly pain, calf pain, chest pain or SOB.  Complete remainder of ROS it otherwise neg.      PE:  Gen:   No apparent distress  Eye:    Visual scanning without deficit  Ear:    Response to auditory stimuli wnl  Lung:    Non-labored breathing on RA noted  Abd:    NTND per patient report  Lymph:    Neg for pitting/non-pitting edema BLE  Vasc:    Pulses palpable, CFT minimally delayed  Neuro:    Light touch sensation intact to all sensory nerve distributions without paresthesias  Derm:    Neg for nodules, lesions or ulcerations  MSK:    Left lower extremity - pain at plantar and plantar medial heel.    Calf:    Neg for redness, swelling or tenderness      Imaging:  MRI left ankle - IMPRESSION:   1. There is prominent edema within the flexor digitorum brevis muscle  just deep to the origin of the plantar fascia. No definite muscle  fiber disruption is seen. This likely represents a prominent muscle  strain. Mild partial thickness tearing would be difficult to exclude.  2. Mild thickening of the plantar fascia origin. This may be related  to chronic plantar fasciitis. There is no increased signal within the  fascia to suggest active fasciitis.  3. No abnormal masses to suggest plantar fibromatosis.    Assessment:  37 year " old female with edema FDB muscle belly plantar left heel      Plan:  Discussed etiologies, anatomy and options  1.  FDB muscle belly inflammation  -continue boot vs comfortable shoes based on pain  -RICE/NSAID prn based on symptoms  -steroid injection, see procedure note  -activity modification based on pain  -she is quite frustrated at her lack of improvement.  She inquired about surgery.  For plantar fasciitis, the surgery is straight forward.  Unfortunately, the muscle belly inflammation is not as amenable to surgical correction and I would be hesitant about recommending surgery as I wouldn't have strong confidence she would be any better    After obtaining written consent, the joint was identified and the skin was prepped with alcohol and betadine.  The joint was distracted and the needle was advance to the underlying flexor digitorum muscle belly and plantar fascia.  1 1/2cc mixture of 2:1 kenalog 40:1% lidocaine plain was injected.  The patient tolerated the procedure without complication.  Risks that were discussed included possible joint/cartilage damage, infection, pigment change, steroid flare.       Follow up:  Prn based on injection results or sooner with acute issues      Body mass index is 35.02 kg/(m^2).  Weight management plan: Patient was referred to their PCP to discuss a diet and exercise plan.         Adam Cannon DPM FACFAS FACFAOM  Podiatric Foot & Ankle Surgeon  [facility]  [phone]

## 2018-09-07 NOTE — PATIENT INSTRUCTIONS
Thank you for choosing [facility]!    DR. BLAIR'S CLINIC LOCATIONS:   MONDAY - [place] TUESDAY - [place]   [address] [facility]  [address]   [place] [address] [place] [address]   [phone]       THURSDAY AM - [place] THURSDAY PM - [place]   [address]   [place] [address] [place] [address]   [phone]       FRIDAY AM - [place] SET UP SURGERY: [phone] [address] APPOINTMENTS: [phone]   [place] [address] BILLING QUESTIONS: [phone] FAX NUMBER: [phone]     Follow Up:     Body Mass Index (BMI)  Many things can cause foot and ankle problems. Foot structure, activity level, foot mechanics and injuries are common causes of pain. One very important issue that often goes unmentioned, is body weight. Extra weight can cause increased stress on muscles, ligaments, bones and tendons. Sometimes just a few extra pounds is all it takes to put one over her/his threshold. Without reducing that stress, it can be difficult to alleviate pain. Some people are uncomfortable addressing this issue, but we feel it is important for you to think about it. As Foot &  Ankle specialists, our job is addressing the lower extremity problem and possible causes. Regarding extra body weight, we encourage patients to discuss diet and weight management plans with their primary care doctors. It is this team approach that gives you the best opportunity for pain relief and getting you back on your feet.            What is Expected After Steroid Injection   The injection that you received today included a steroid. This is a strong steroid that decreases inflammation, reduces pain, and promotes healing. This type of steroid is different than anabolic steroids abused by body builders and professional athletes.   The injection also included a small dose of local anesthetic. This will  result in local numbness for at least a few hours. The initial reduction in pain may simply be the effect of the anesthetic. The steroid will start to work as the local anesthetic is wearing off. It is hard to predict the degree of pain relief or the duration of benefit from a steroid injection. The injection may need to be repeated depending on your body's response and ongoing pain and problems.   Some people experience a few days of increased pain after a steroid injection. This reaction is partly due to bleeding or bruising immediately after the injection. The localized pain may last for a few days and can be treated with extra strength tylenol, local ice, and decreased activity. The pain should resolve as the steroid starts to take effect which can take up to two weeks. Please do not hesitate to call if you continue having problems beyond what is described above.

## 2018-09-10 PROBLEM — M72.2 PLANTAR FASCIITIS: Status: RESOLVED | Noted: 2018-05-31 | Resolved: 2018-09-10

## 2018-09-10 NOTE — PROGRESS NOTES
Subjective:  HPI                    Objective:  System    Physical Exam    General     ROS    Assessment/Plan:    DISCHARGE REPORT    Progress reporting period is from 5/31/18 to 6/22/18.       SUBJECTIVE  Subjective changes noted by patient:  Had a good day the day after her last treatment. Has been wearing tennis shoes more. Patient cancelled appointments on 6/25/18 and 7/2/18 due to being in the hospital and no showed her appointment on 7/5/18, failing to return for any appointments after 6/22/18  Current pain level is 2/10  .     Previous pain level was   2/10.   Changes in function:  Little to none  Adverse reaction to treatment or activity: None    OBJECTIVE  Changes noted in objective findings:  Yes, mild to moderate L plantar fascia fibrosis. Active L DF 18 deg.. passive great toe extension 80 deg.             ASSESSMENT/PLAN  Updated problem list and treatment plan: Diagnosis 1:  Left plantar fascitis    Pain -  home program  Decreased ROM/flexibility - home program  Inflammation - self management/home program  Decreased function - home program  STG/LTGs have been met or progress has been made towards goals:  Yes (See Goal flow sheet completed today.)  Assessment of Progress: The patient has not returned to therapy. Current status is unknown.  Self Management Plans:  Patient has been instructed in a home treatment program.    Mary continues to require the following intervention to meet STG and LTG's:  PT intervention is no longer required to meet STG/LTG.    Recommendations:  This patient failed to return and will be discharged from therapy and continue their home treatment program.    Please refer to the daily flowsheet for treatment today, total treatment time and time spent performing 1:1 timed codes.

## 2018-09-13 RX ORDER — TRIAMCINOLONE ACETONIDE 40 MG/ML
40 INJECTION, SUSPENSION INTRA-ARTICULAR; INTRAMUSCULAR ONCE
Qty: 1 ML | Refills: 0 | OUTPATIENT
Start: 2018-09-13 | End: 2018-09-13

## 2018-09-27 ENCOUNTER — TELEPHONE (OUTPATIENT)
Dept: FAMILY MEDICINE | Facility: CLINIC | Age: 38
End: 2018-09-27

## 2018-09-27 NOTE — TELEPHONE ENCOUNTER
Panel Management Review      Patient has the following on her problem list: None      Composite cancer screening  Chart review shows that this patient is due/due soon for the following Pap Smear  Summary:    Patient is due/failing the following:   PAP    Action needed:   Patient needs office visit for Cervical Screening.    Type of outreach:    Phone, spoke to patient.  - [facility]  [facility] a [facility] and it has been stated that I fax the request to them. A fax has been sesnt to [phone] with all of the patients information so that we can get the medical information.      Fax was sent at 12:02 pm.    Questions for provider review:    None                                                                                                                                    Debora.SERAFIN Guevara ([facility])       Chart routed to Care Team .

## 2018-10-06 ENCOUNTER — OFFICE VISIT (OUTPATIENT)
Dept: URGENT CARE | Facility: URGENT CARE | Age: 38
End: 2018-10-06
Payer: COMMERCIAL

## 2018-10-06 VITALS
BODY MASS INDEX: 34.87 KG/M2 | SYSTOLIC BLOOD PRESSURE: 120 MMHG | RESPIRATION RATE: 16 BRPM | HEART RATE: 90 BPM | TEMPERATURE: 98.6 F | WEIGHT: 217 LBS | OXYGEN SATURATION: 96 % | HEIGHT: 66 IN | DIASTOLIC BLOOD PRESSURE: 72 MMHG

## 2018-10-06 DIAGNOSIS — H83.09: Primary | ICD-10-CM

## 2018-10-06 PROCEDURE — 99213 OFFICE O/P EST LOW 20 MIN: CPT | Performed by: FAMILY MEDICINE

## 2018-10-06 RX ORDER — ONDANSETRON 4 MG/1
4-8 TABLET, ORALLY DISINTEGRATING ORAL EVERY 8 HOURS PRN
Qty: 20 TABLET | Refills: 1 | Status: SHIPPED | OUTPATIENT
Start: 2018-10-06 | End: 2020-07-31

## 2018-10-06 RX ORDER — MECLIZINE HYDROCHLORIDE 25 MG/1
25 TABLET ORAL EVERY 6 HOURS PRN
Qty: 30 TABLET | Refills: 1 | Status: SHIPPED | OUTPATIENT
Start: 2018-10-06 | End: 2020-07-31

## 2018-10-06 NOTE — PROGRESS NOTES
SUBJECTIVE:   Chief Complaint   Patient presents with     Dizziness     x 2 days, also HA, nausea, dry heaving.. laying down helped, took some Excederin with little relief     Mary Reyes is a 37 year old female complains of dizziness for 2 days.   Timing: gradual onset, still present and constant onset during, light activity, change in  position and movement of head.  Quality: room spinning/falling/movement, off balance.  The symptoms become more intense with :movement of the head.  Associated symptoms: has some residual cough, stuffiness from a bronchitis/ sinusitis she treated recently with antibiotics;  does interfere with activities of daily living;  reports some previous problems with similar symptoms-  In the past associated with ear/ sinus infection, with shorter duration.  Now symptoms most severe and URI symptoms minimal     Past Medical History:   Diagnosis Date     PONV (postoperative nausea and vomiting)      Patient Active Problem List   Diagnosis     Bariatric surgery status     Postsurgical nonabsorption       ALLERGIES:  Review of patient's allergies indicates no known allergies.      Current Outpatient Prescriptions on File Prior to Visit:  Biotin 1 MG CAPS Take by mouth daily   Calcium Carbonate-Vitamin D (CALCIUM + D PO) Take  by mouth. 1 PO each meal   Cyanocobalamin (VITAMIN B-12 SL) Place 2,000 mcg under the tongue daily.   IRON CR PO Take by mouth daily   Multiple Vitamin (MULTI-VITAMIN PO) Take  by mouth daily. 2 PO daily.    Omega-3 Fatty Acids (FISH OIL CONCENTRATE PO) Take 2 capsules by mouth daily   VITAMIN D, ERGOCALCIFEROL, PO Take 2,000 Int'l Units by mouth. 2 daily.      No current facility-administered medications on file prior to visit.     Social History   Substance Use Topics     Smoking status: Former Smoker     Packs/day: 0.50     Years: 10.00     Types: Cigarettes     Quit date: 1/1/2006     Smokeless tobacco: Never Used     Alcohol use No       Family History   Problem  "Relation Age of Onset     No Known Problems Mother      No Known Problems Father        ROS:  CONSTITUTIONAL:NEGATIVE for fever, chills,    INTEGUMENTARY/SKIN: NEGATIVE for worrisome rashes,   EYES: NEGATIVE for vision changes or irritation  ENT/MOUTH: NEGATIVE for ear, mouth and throat problems.  Sinus symptoms resolved  GI: NEGATIVE for nausea, abdominal pain,      OBJECTIVE:  /72 (BP Location: Right arm, Patient Position: Chair, Cuff Size: Adult Regular)  Pulse 90  Temp 98.6  F (37  C) (Oral)  Resp 16  Ht 5' 6\" (1.676 m)  Wt 217 lb (98.4 kg)  LMP 09/15/2018  SpO2 96%  Breastfeeding? No  BMI 35.02 kg/m2     GENERAL APPEARANCE: alert, mild distress, cooperative,   EYES: EOMI,  PERRL, conjunctiva clear,  Fundi normal  HENT: ear canals and TM's normal.  Nose and mouth without ulcers, erythema or lesions.  No sinus tenderness  NECK: supple, nontender, no lymphadenopathy  RESP: lungs clear to auscultation - no rales, rhonchi or wheezes  CV: regular rates and rhythm, normal S1 S2, no murmur noted  SKIN: no suspicious lesions or rashes     NEURO:  Mental status normal. Gait and station normal. Romberg negative. Cerebellar function is normal.   Motor, sensory function normal in all extremities.    Walks on toes, heels and tandem walk without difficulty.   Normal strength and tone,  Normal speech and mentation, cranial nerves normal  Pulse regular. Rapid changes in head position during the exam do precipitate brief dizziness  .         ASSESSMENT:  Labyrinthitis, acute viral, unspecified laterality     - meclizine (ANTIVERT) 25 MG tablet; Take 1 tablet (25 mg) by mouth every 6 hours as needed for dizziness  - ondansetron (ZOFRAN ODT) 4 MG ODT tab; Take 1-2 tablets (4-8 mg) by mouth every 8 hours as needed for nausea     The patient is reassured that these symptoms do not appear to represent a serious or threatening condition. This is generally a self-limited temporary but uncomfortable situation.  We " discussed the role of the semi-circular canals in detecting and maintaining balance and that a viral illness in the ear/ sinus can cause swelling in the semi-circular canals and promote vertigo symptoms      Rest, avoid potentially dangerous activities (such as driving or working with machinery or at heights), use OTC Meclizine prn.      Should go to the ED if patient develops other symptoms, such as alterations of speech, swallowing, vision, motor or sensory systems.      Follow-up with primary care or ENT if dizziness persists or worsens.    Patient education materials were provided about diagnosis and treatment

## 2018-10-06 NOTE — PATIENT INSTRUCTIONS
Labyrinthitis    The inner ear is located behind the middle ear. It is part of the balance center of your body. When the inner ear becomes irritated or inflamed it causes a condition known as labyrinthitis. It may due to a viral infection, but often a cause is not found. Labyrinthitis causes sudden dizziness and balance problems. It often causes a feeling that you or the room is spinning (vertigo). You may feel nauseated or throw up. You may also feel a loss of balance when trying to walk. Head movement from side to side or changes in body position (from lying to sitting or standing) may worsen symptoms. You may have ringing in the ear. Hearing may also be affected.  An episode of labyrinthitis may last seconds, minutes, or hours. It may never return. Or symptoms may recur off and on over several weeks or longer. In many cases, the problem is short-term and goes away when the inner ear issue resolves.  Home care    Take medicine as prescribed to relieve your symptoms. Unless another medicine was prescribed, you can try over-the-counter motion sickness pills. Note that these medicines may cause drowsiness.    If symptoms are severe, rest quietly in bed. Change positions slowly. There may be 1 position feels best, such as lying on 1 side or lying on your back with your head slightly raised on pillows. Until you have no symptoms, you are at a higher risk of falling. Let someone help you when you get up. Get rid of home hazards such as loose electrical cords and throw rugs. Don t walk in unfamiliar areas that are not lighted. Use night lights in bathrooms and kitchen areas.    Vestibular rehabilitation exercises are done by moving your head to help fix problems in the inner ear. If these exercises have been prescribed, do them as you have been instructed.    Do not drive or work with dangerous machinery until 1 week has passed without symptoms. Be careful when using stairs.  Follow-up care  Follow up with your  healthcare provider or as advised by our staff.  When to seek medical advice  Call your healthcare provider for any of the following:    Symptoms that are not controlled by medicine     Symptoms that get worse    Repeated vomiting that is not relieved by medicine    Weakness that gets worse    Fainting    Headache or unusual drowsiness    Trouble with vision or speech    Trouble moving your face, arms, or legs    Hearing loss    Symptoms that last more than a few days  Date Last Reviewed: 11/1/2017 2000-2017 [employer]. [address] [place], [place] [id number]. All rights reserved. This information is not intended as a substitute for professional medical care. Always follow your healthcare professional's instructions.

## 2018-10-06 NOTE — MR AVS SNAPSHOT
After Visit Summary   10/6/2018    Mary Reyes    MRN: [id number]           Patient Information     Date Of Birth          1980        Visit Information        Provider Department      10/6/2018 9:30 AM Gemini Celis MD [facility]        Today's Diagnoses     Labyrinthitis, acute viral, unspecified laterality    -  1      Care Instructions      Labyrinthitis    The inner ear is located behind the middle ear. It is part of the balance center of your body. When the inner ear becomes irritated or inflamed it causes a condition known as labyrinthitis. It may due to a viral infection, but often a cause is not found. Labyrinthitis causes sudden dizziness and balance problems. It often causes a feeling that you or the room is spinning (vertigo). You may feel nauseated or throw up. You may also feel a loss of balance when trying to walk. Head movement from side to side or changes in body position (from lying to sitting or standing) may worsen symptoms. You may have ringing in the ear. Hearing may also be affected.  An episode of labyrinthitis may last seconds, minutes, or hours. It may never return. Or symptoms may recur off and on over several weeks or longer. In many cases, the problem is short-term and goes away when the inner ear issue resolves.  Home care    Take medicine as prescribed to relieve your symptoms. Unless another medicine was prescribed, you can try over-the-counter motion sickness pills. Note that these medicines may cause drowsiness.    If symptoms are severe, rest quietly in bed. Change positions slowly. There may be 1 position feels best, such as lying on 1 side or lying on your back with your head slightly raised on pillows. Until you have no symptoms, you are at a higher risk of falling. Let someone help you when you get up. Get rid of home hazards such as loose electrical cords and throw rugs. Don t walk in unfamiliar areas that are not lighted. Use  night lights in bathrooms and kitchen areas.    Vestibular rehabilitation exercises are done by moving your head to help fix problems in the inner ear. If these exercises have been prescribed, do them as you have been instructed.    Do not drive or work with dangerous machinery until 1 week has passed without symptoms. Be careful when using stairs.  Follow-up care  Follow up with your healthcare provider or as advised by our staff.  When to seek medical advice  Call your healthcare provider for any of the following:    Symptoms that are not controlled by medicine     Symptoms that get worse    Repeated vomiting that is not relieved by medicine    Weakness that gets worse    Fainting    Headache or unusual drowsiness    Trouble with vision or speech    Trouble moving your face, arms, or legs    Hearing loss    Symptoms that last more than a few days  Date Last Reviewed: 11/1/2017 2000-2017 [employer]. [address] [place] [address]. All rights reserved. This information is not intended as a substitute for professional medical care. Always follow your healthcare professional's instructions.                Follow-ups after your visit        Who to contact     If you have questions or need follow up information about today's clinic visit or your schedule please contact [facility] directly at [phone].  Normal or non-critical lab and imaging results will be communicated to you by MyChart, letter or phone within 4 business days after the clinic has received the results. If you do not hear from us within 7 days, please contact the clinic through MyChart or phone. If you have a critical or abnormal lab result, we will notify you by phone as soon as possible.  Submit refill requests through [employer] or call your pharmacy and they will forward the refill request to us. Please allow 3 business days for your refill to be completed.          Additional Information About Your  "Visit        [employer] Information     [employer] gives you secure access to your electronic health record. If you see a primary care provider, you can also send messages to your care team and make appointments. If you have questions, please call your primary care clinic.  If you do not have a primary care provider, please call [phone] and they will assist you.        Care EveryWhere ID     This is your Care EveryWhere ID. This could be used by other organizations to access your [facility] medical records  [id number]        Your Vitals Were     Pulse Temperature Respirations Height Last Period Pulse Oximetry    90 98.6  F (37  C) (Oral) 16 5' 6\" (1.676 m) 09/15/2018 96%    Breastfeeding? BMI (Body Mass Index)                No 35.02 kg/m2           Blood Pressure from Last 3 Encounters:   10/06/18 120/72   09/07/18 116/70   07/27/18 118/60    Weight from Last 3 Encounters:   10/06/18 217 lb (98.4 kg)   09/07/18 217 lb (98.4 kg)   07/27/18 217 lb (98.4 kg)              Today, you had the following     No orders found for display         Today's Medication Changes          These changes are accurate as of 10/6/18  9:52 AM.  If you have any questions, ask your nurse or doctor.               Start taking these medicines.        Dose/Directions    meclizine 25 MG tablet   Commonly known as:  ANTIVERT   Used for:  Labyrinthitis, acute viral, unspecified laterality   Started by:  Gemini Celis MD        Dose:  25 mg   Take 1 tablet (25 mg) by mouth every 6 hours as needed for dizziness   Quantity:  30 tablet   Refills:  1       ondansetron 4 MG ODT tab   Commonly known as:  ZOFRAN ODT   Used for:  Labyrinthitis, acute viral, unspecified laterality   Started by:  Gemini Celis MD        Dose:  4-8 mg   Take 1-2 tablets (4-8 mg) by mouth every 8 hours as needed for nausea   Quantity:  20 tablet   Refills:  1            Where to get your medicines      These medications were sent to [id number] - " [place], [place] - [address] [facility] [address]  [address] [facility], [facility] [address]     Phone:  [phone]     meclizine 25 MG tablet    ondansetron 4 MG ODT tab                Primary Care Provider Fax #    [facility] [phone]       [address]  [facility] [address]        Equal Access to Services     SAMANTHA BEACH : Hadii aad ku hadasho Soomaali, waaxda luqadaha, qaybta kaalmada adeegyada, waxay idiin hayaan adeeg kharash la'aan ah. So [facility] [phone].    ATENCIÓN: Si habla español, tiene a cobian disposición servicios gratuitos de asistencia lingüística. Chen al [phone].    We comply with applicable federal civil rights laws and [place] laws. We do not discriminate on the basis of race, color, national origin, age, disability, sex, sexual orientation, or gender identity.            Thank you!     Thank you for choosing [facility]  for your care. Our goal is always to provide you with excellent care. Hearing back from our patients is one way we can continue to improve our services. Please take a few minutes to complete the written survey that you may receive in the mail after your visit with us. Thank you!             Your Updated Medication List - Protect others around you: Learn how to safely use, store and throw away your medicines at www.disposemymeds.org.          This list is accurate as of 10/6/18  9:52 AM.  Always use your most recent med list.                   Brand Name Dispense Instructions for use Diagnosis    Biotin 1 MG Caps      Take by mouth daily        CALCIUM + D PO      Take  by mouth. 1 PO each meal        FISH OIL CONCENTRATE PO      Take 2 capsules by mouth daily        IRON CR PO      Take by mouth daily        meclizine 25 MG tablet    ANTIVERT    30 tablet    Take 1 tablet (25 mg) by mouth every 6 hours as needed for dizziness    Labyrinthitis, acute viral, unspecified laterality       MULTI-VITAMIN PO       Take  by mouth daily. 2 PO daily.        ondansetron 4 MG ODT tab    ZOFRAN ODT    20 tablet    Take 1-2 tablets (4-8 mg) by mouth every 8 hours as needed for nausea    Labyrinthitis, acute viral, unspecified laterality       VITAMIN B-12 SL      Place 2,000 mcg under the tongue daily.        VITAMIN D (ERGOCALCIFEROL) PO      Take 2,000 Int'l Units by mouth. 2 daily.

## 2019-01-25 NOTE — TELEPHONE ENCOUNTER
RECORDS RECEIVED FROM: Bilateral plantar fasciitis, [facility], [facility]s in Epic, Prev seen at [facility]   DATE RECEIVED: 1/25/19   NOTES STATUS DETAILS   OFFICE NOTE from referring provider Received 8/30/18 KRISTIAN Braxton   OFFICE NOTE from other specialist Internal 9/7/18 Dr. Cannon   DISCHARGE SUMMARY from hospital N/A    DISCHARGE REPORT from the ER N/A    OPERATIVE REPORT N/A    MEDICATION LIST Internal    IMPLANT RECORD/STICKER N/A    LABS     CBC/DIFF N/A    CULTURES N/A    INJECTIONS DONE IN RADIOLOGY Internal 9/7/18   MRI Internal 7/31/18   CT SCAN N/A    XRAYS (IMAGES & REPORTS) N/A    TUMOR     PATHOLOGY  Slides & report N/A

## 2019-01-28 NOTE — PROGRESS NOTES
Date of Service: 1/29/2019    Chief Complaint   Patient presents with     Consult     Plantar fasciitis. [facility]. Patient stated that she has done everything. Patient wears shoes, has a night splint, has done PT, injections.            HPI: Mary is a 38 year old female who presents today for further evaluation of BL plantar fasciitis.  Nature: sharp/dull/aching    Location: BL heels L>>R    Duration: about a year    Onset: gradual. No inciting trauma.    Course: waxes and wanes.     Aggravating/alleviating factors: walking and weight bearing aggravate. + Post-static dyskinesia.     Previous Treatments: PT, CAM, Shoe change, injections. She once had prednisone for her spine and that actually helped her feet.       Review of Systems: Answers for HPI/ROS submitted by the patient on 1/29/2019   General Symptoms: No  Skin Symptoms: No  HENT Symptoms: No  EYE SYMPTOMS: No  HEART SYMPTOMS: No  LUNG SYMPTOMS: No  INTESTINAL SYMPTOMS: No  URINARY SYMPTOMS: No  GYNECOLOGIC SYMPTOMS: No  BREAST SYMPTOMS: No  SKELETAL SYMPTOMS: No  BLOOD SYMPTOMS: No  NERVOUS SYSTEM SYMPTOMS: No  MENTAL HEALTH SYMPTOMS: No      PMH:   Past Medical History:   Diagnosis Date     PONV (postoperative nausea and vomiting)        PSxH:   Past Surgical History:   Procedure Laterality Date     ENT SURGERY      wisdom teeth out     LAPAROSCOPIC GASTRIC ADJUSTABLE BANDING  1/26/2012    Procedure:LAPAROSCOPIC GASTRIC ADJUSTABLE BANDING; LAPAROSCOPIC  ADJUSTABLE GASTRIC BANDING ; Surgeon:TRAVIS RAO; Location: OR     LAPAROSCOPIC GASTRIC ADJUSTABLE BANDING  8/8/2013    Procedure: LAPAROSCOPIC GASTRIC ADJUSTABLE BANDING;  LAPAROSCOPIC REPOSITIONING OF SLIPPED LAP BAND AND LAP BAND PORT REPLACEMENT (RAPID PORT) ;  Surgeon: Travis Rao MD;  Location:  OR     REPLACE PORT GASTRIC BAND  8/8/2013    Procedure: REPLACE PORT GASTRIC BAND;;  Surgeon: Travis Rao MD;  Location:  [facility] OR       Allergies: Patient has no known allergies.    SH:   Social History     Socioeconomic History     Marital status: Single     Spouse name: Not on file     Number of children: Not on file     Years of education: Not on file     Highest education level: Not on file   Social Needs     Financial resource strain: Not on file     Food insecurity - worry: Not on file     Food insecurity - inability: Not on file     Transportation needs - medical: Not on file     Transportation needs - non-medical: Not on file   Occupational History     Not on file   Tobacco Use     Smoking status: Former Smoker     Packs/day: 0.50     Years: 10.00     Pack years: 5.00     Types: Cigarettes     Last attempt to quit: 2006     Years since quittin.0     Smokeless tobacco: Never Used   Substance and Sexual Activity     Alcohol use: No     Drug use: No     Sexual activity: Not on file   Other Topics Concern     Parent/sibling w/ CABG, MI or angioplasty before 65F 55M? Not Asked   Social History Narrative     Not on file       FH:   Family History   Problem Relation Age of Onset     No Known Problems Mother      No Known Problems Father        Objective:  Data Unavailable Data Unavailable Data Unavailable Data Unavailable Data Unavailable 0 lbs 0 oz    PT and DP pulses are 2/4 bilaterally. CRT is instant. Positive pedal hair.   Gross sensation is intact bilaterally.   Equinus is noted bilaterally. No pain with active or passive ROM of the ankle, MTJ, 1st ray, or halluces bilaterally,. Pain noted with palpation of the medial attachments on the plantar fascias BL L>>R. No pain along the courses of the PT, peroneal, or Achilles tendons BL.   Nails normal bilaterally. No open lesions are noted.     MRI 18  IMPRESSION:   1. There is prominent edema within the flexor digitorum brevis muscle  just deep to the origin of the plantar fascia. No definite muscle  fiber disruption is seen. This likely represents a prominent  muscle  strain. Mild partial thickness tearing would be difficult to exclude.  2. Mild thickening of the plantar fascia origin. This may be related  to chronic plantar fasciitis. There is no increased signal within the  fascia to suggest active fasciitis.  3. No abnormal masses to suggest plantar fibromatosis.     CIRO YOUNGBLOOD MD    Assessment: BL plantar fasciitis and FDB muscle strain. Has been very resistant to treatments so far. Prednisone did help. I did speak to Dr. Benavides about this case and we think that the FDB edema may be an incidental finding.     Plan:  - Pt seen and evaluated.  - MRI reviewed.  - Recommendations: Medrol dose pack, NWB with a CAM and crutches, and cryotherapy to the heel. She agrees to this. Medrol rx was given to her. She has a CAM and crutches. I will send her to [facility] for assessment and treatment.  - See again in 3 weeks for assessment.

## 2019-01-29 ENCOUNTER — OFFICE VISIT (OUTPATIENT)
Dept: ORTHOPEDICS | Facility: CLINIC | Age: 39
End: 2019-01-29
Payer: COMMERCIAL

## 2019-01-29 ENCOUNTER — PRE VISIT (OUTPATIENT)
Dept: ORTHOPEDICS | Facility: CLINIC | Age: 39
End: 2019-01-29

## 2019-01-29 DIAGNOSIS — M72.2 PLANTAR FASCIITIS: Primary | ICD-10-CM

## 2019-01-29 RX ORDER — METHYLPREDNISOLONE 4 MG
TABLET, DOSE PACK ORAL
Qty: 21 TABLET | Refills: 0 | Status: SHIPPED | OUTPATIENT
Start: 2019-01-29 | End: 2020-07-31

## 2019-01-29 NOTE — NURSING NOTE
Reason For Visit:   Chief Complaint   Patient presents with     Consult     Plantar fasciitis. [facility]. Patient stated that she has done everything. Patient wears shoes, has a night splint, has done PT, injections.         Pain Assessment  Patient Currently in Pain: Yes  Primary Pain Location: Foot(Bilateral)             Current Outpatient Medications   Medication Sig Dispense Refill     Biotin 1 MG CAPS Take by mouth daily       Calcium Carbonate-Vitamin D (CALCIUM + D PO) Take  by mouth. 1 PO each meal       Cyanocobalamin (VITAMIN B-12 SL) Place 2,000 mcg under the tongue daily.       IRON CR PO Take by mouth daily       Multiple Vitamin (MULTI-VITAMIN PO) Take  by mouth daily. 2 PO daily.        Omega-3 Fatty Acids (FISH OIL CONCENTRATE PO) Take 2 capsules by mouth daily       VITAMIN D, ERGOCALCIFEROL, PO Take 2,000 Int'l Units by mouth. 2 daily.        meclizine (ANTIVERT) 25 MG tablet Take 1 tablet (25 mg) by mouth every 6 hours as needed for dizziness (Patient not taking: Reported on 1/29/2019) 30 tablet 1     ondansetron (ZOFRAN ODT) 4 MG ODT tab Take 1-2 tablets (4-8 mg) by mouth every 8 hours as needed for nausea (Patient not taking: Reported on 1/29/2019) 20 tablet 1        No Known Allergies

## 2019-01-29 NOTE — LETTER
1/29/2019       RE: Mary Reyes  [id number] [facility] [address]     Dear Colleague,    Thank you for referring your patient, Mary Reyes[facility] Please see a copy of my visit note below.    Date of Service: 1/29/2019    Chief Complaint   Patient presents with     Consult     Plantar fasciitis. [facility]. Patient stated that she has done everything. Patient wears shoes, has a night splint, has done PT, injections.            HPI: Mary is a 38 year old female who presents today for further evaluation of BL plantar fasciitis.  Nature: sharp/dull/aching    Location: BL heels L>>R    Duration: about a year    Onset: gradual. No inciting trauma.    Course: waxes and wanes.     Aggravating/alleviating factors: walking and weight bearing aggravate. + Post-static dyskinesia.     Previous Treatments: PT, CAM, Shoe change, injections. She once had prednisone for her spine and that actually helped her feet.       Review of Systems: Answers for HPI/ROS submitted by the patient on 1/29/2019   General Symptoms: No  Skin Symptoms: No  HENT Symptoms: No  EYE SYMPTOMS: No  HEART SYMPTOMS: No  LUNG SYMPTOMS: No  INTESTINAL SYMPTOMS: No  URINARY SYMPTOMS: No  GYNECOLOGIC SYMPTOMS: No  BREAST SYMPTOMS: No  SKELETAL SYMPTOMS: No  BLOOD SYMPTOMS: No  NERVOUS SYSTEM SYMPTOMS: No  MENTAL HEALTH SYMPTOMS: No      PMH:   Past Medical History:   Diagnosis Date     PONV (postoperative nausea and vomiting)        PSxH:   Past Surgical History:   Procedure Laterality Date     ENT SURGERY      wisdom teeth out     LAPAROSCOPIC GASTRIC ADJUSTABLE BANDING  1/26/2012    Procedure:LAPAROSCOPIC GASTRIC ADJUSTABLE BANDING; LAPAROSCOPIC  ADJUSTABLE GASTRIC BANDING ; Surgeon:TRAVIS RAO Location: OR     LAPAROSCOPIC GASTRIC ADJUSTABLE BANDING  8/8/2013    Procedure: LAPAROSCOPIC  GASTRIC ADJUSTABLE BANDING;  LAPAROSCOPIC REPOSITIONING OF SLIPPED LAP BAND AND LAP BAND PORT REPLACEMENT (RAPID PORT) ;  Surgeon: Albino Celeste MD;  Location:  OR     REPLACE PORT GASTRIC BAND  2013    Procedure: REPLACE PORT GASTRIC BAND;;  Surgeon: Albino Celeste MD;  Location: [facility] OR       Allergies: Patient has no known allergies.    SH:   Social History     Socioeconomic History     Marital status: Single     Spouse name: Not on file     Number of children: Not on file     Years of education: Not on file     Highest education level: Not on file   Social Needs     Financial resource strain: Not on file     Food insecurity - worry: Not on file     Food insecurity - inability: Not on file     Transportation needs - medical: Not on file     Transportation needs - non-medical: Not on file   Occupational History     Not on file   Tobacco Use     Smoking status: Former Smoker     Packs/day: 0.50     Years: 10.00     Pack years: 5.00     Types: Cigarettes     Last attempt to quit: 2006     Years since quittin.0     Smokeless tobacco: Never Used   Substance and Sexual Activity     Alcohol use: No     Drug use: No     Sexual activity: Not on file   Other Topics Concern     Parent/sibling w/ CABG, MI or angioplasty before 65F 55M? Not Asked   Social History Narrative     Not on file       FH:   Family History   Problem Relation Age of Onset     No Known Problems Mother      No Known Problems Father        Objective:  Data Unavailable Data Unavailable Data Unavailable Data Unavailable Data Unavailable 0 lbs 0 oz    PT and DP pulses are 2/4 bilaterally. CRT is instant. Positive pedal hair.   Gross sensation is intact bilaterally.   Equinus is noted bilaterally. No pain with active or passive ROM of the ankle, MTJ, 1st ray, or halluces bilaterally,. Pain noted with palpation of the medial attachments on the plantar fascias BL L>>R. No pain along the courses of the PT, peroneal, or Achilles tendons BL.    Nails normal bilaterally. No open lesions are noted.     MRI 7/31/18  IMPRESSION:   1. There is prominent edema within the flexor digitorum brevis muscle  just deep to the origin of the plantar fascia. No definite muscle  fiber disruption is seen. This likely represents a prominent muscle  strain. Mild partial thickness tearing would be difficult to exclude.  2. Mild thickening of the plantar fascia origin. This may be related  to chronic plantar fasciitis. There is no increased signal within the  fascia to suggest active fasciitis.  3. No abnormal masses to suggest plantar fibromatosis.     CIRO YOUNGBLOOD MD    Assessment: BL plantar fasciitis and FDB muscle strain. Has been very resistant to treatments so far. Prednisone did help. I did speak to Dr. Benavides about this case and we think that the FDB edema may be an incidental finding.     Plan:  - Pt seen and evaluated.  - MRI reviewed.  - Recommendations: Medrol dose pack, NWB with a CAM and crutches, and cryotherapy to the heel. She agrees to this. Medrol rx was given to her. She has a CAM and crutches. I will send her to [facility] for assessment and treatment.  - See again in 3 weeks for assessment.             Again, thank you for allowing me to participate in the care of your patient.      Sincerely,    Ziggy Ferreira DPM

## 2019-02-05 ENCOUNTER — TELEPHONE (OUTPATIENT)
Dept: ORTHOPEDICS | Facility: CLINIC | Age: 39
End: 2019-02-05

## 2019-02-05 NOTE — TELEPHONE ENCOUNTER
Per Dr. Ferreira's nurse the pt was contacted on another encounter where they discussed this topic. No further calls needed for this encounter.    Tino ALICEA ATC

## 2019-02-05 NOTE — TELEPHONE ENCOUNTER
[facility]    Phone Message    May a detailed message be left on voicemail: yes    Reason for Call: Other: Torin from [facility] would like to speak to a nurse reguarding the referral for [facility]. [facility].Please call Torin back to discuss.      Action Taken: Message routed to:  [facility]o

## 2019-02-12 ENCOUNTER — TELEPHONE (OUTPATIENT)
Dept: ORTHOPEDICS | Facility: CLINIC | Age: 39
End: 2019-02-12

## 2019-02-12 ENCOUNTER — DOCUMENTATION ONLY (OUTPATIENT)
Dept: ORTHOPEDICS | Facility: CLINIC | Age: 39
End: 2019-02-12

## 2019-02-12 NOTE — TELEPHONE ENCOUNTER
Called pt and left message that work letter was placed in the mail for her today.    Tino ALICEA, ATC

## 2019-02-15 ENCOUNTER — HEALTH MAINTENANCE LETTER (OUTPATIENT)
Age: 39
End: 2019-02-15

## 2019-02-25 ENCOUNTER — TELEPHONE (OUTPATIENT)
Dept: ORTHOPEDICS | Facility: CLINIC | Age: 39
End: 2019-02-25

## 2019-02-25 NOTE — TELEPHONE ENCOUNTER
[facility] Call Center    Phone Message    May a detailed message be left on voicemail: yes    Reason for Call: Medication Question or concern regarding medication   Prescription Clarification  Name of Medication: methylPREDNISolone    Prescribing Provider: Dr. Ferreira   Pharmacy: [facility]   What on the order needs clarification? Marina wanted to review how Dr. Ferreira would like her to take the medication.  Is it similar to prednisone?  She is concerned that she did not get enough.          Action Taken: Message routed to:  [facility] ump ortho

## 2019-02-28 ENCOUNTER — MYC MEDICAL ADVICE (OUTPATIENT)
Dept: ORTHOPEDICS | Facility: CLINIC | Age: 39
End: 2019-02-28

## 2019-02-28 NOTE — TELEPHONE ENCOUNTER
[facility] Call Center    Phone Message    May a detailed message be left on voicemail: yes    Reason for Call: Other: Patient needs to be scheduled within March 8th through the 13th with Dr. Ferreira for a follow up per him. No availibility, please call to schedule. Also call regarding patient's medication     Action Taken: Message routed to:  [facility] orthopedic

## 2019-04-02 ENCOUNTER — OFFICE VISIT (OUTPATIENT)
Dept: ORTHOPEDICS | Facility: CLINIC | Age: 39
End: 2019-04-02
Payer: COMMERCIAL

## 2019-04-02 DIAGNOSIS — M72.2 PLANTAR FASCIITIS: Primary | ICD-10-CM

## 2019-04-02 DIAGNOSIS — B35.1 OM (ONYCHOMYCOSIS): ICD-10-CM

## 2019-04-02 NOTE — LETTER
4/2/2019       RE: Mary Reyes  [id number] [facility] [id number]     Dear Colleague,    Thank you for referring your patient, Mary Reyes, to the [facility]. Please see a copy of my visit note below.    Chief Complaint:   Chief Complaint   Patient presents with     Right Foot - Follow Up     Left Foot - Follow Up     Follow Up     Plantar fascitis - Bilateral         No Known Allergies    Subjective: Mary is a 38 year old female who presents to the clinic today for a follow up of BL plantar fasciitis. Relates that she did stay at home for 2 weeks while she was on the dose pack and relates little improvement in the pain. She also has a new right knee injury. Cryotherapy was not covered and she did not do this. She did run a mud race since the last visit and completed it.     Objective  Data Unavailable Data Unavailable Data Unavailable Data Unavailable Data Unavailable 0 lbs 0 oz  PT and DP pulses are 2/4 bilaterally. CRT is instant. Positive pedal hair.   Gross sensation is intact bilaterally.   Equinus is noted bilaterally. No pain with active or passive ROM of the ankle, MTJ, 1st ray, or halluces bilaterally,. Pain noted with palpation of the medial attachments on the plantar fascias BL L>>R. No pain along the courses of the PT, peroneal, or Achilles tendons BL.   Nails normal bilaterally. No open lesions are noted.     Assessment: BL plantar fasciitis - no resolution and has tried multiple conservative treatments.     Plan:   - Pt seen and evaluated  - Would like to see if [facility] injection is covered. [facility] Can [facility] if covered. She will let me know.   - Pt to return to clinic with info about PRP injection.     Again, thank you for allowing me to participate in the care of your patient.      Sincerely,    Ziggy Ferreira DPM

## 2019-04-02 NOTE — NURSING NOTE
Reason For Visit:   Chief Complaint   Patient presents with     Right Foot - Follow Up     Left Foot - Follow Up     Follow Up     Plantar fascitis - Bilateral        There were no vitals taken for this visit.    Pain Assessment  Patient Currently in Pain: Yes  0-10 Pain Scale: 2  Primary Pain Location: Foot(Bilateral )  Pain Descriptors: Constant, Nagging    Darrius Carpio, SERAFIN

## 2019-04-04 NOTE — PROGRESS NOTES
Chief Complaint:   Chief Complaint   Patient presents with     Right Foot - Follow Up     Left Foot - Follow Up     Follow Up     Plantar fascitis - Bilateral         No Known Allergies      Subjective: Mary is a 38 year old female who presents to the clinic today for a follow up of BL plantar fasciitis. Relates that she did stay at home for 2 weeks while she was on the dose pack and relates little improvement in the pain. She also has a new right knee injury. Cryotherapy was not covered and she did not do this. She did run a mud race since the last visit and completed it.     Objective  Data Unavailable Data Unavailable Data Unavailable Data Unavailable Data Unavailable 0 lbs 0 oz  PT and DP pulses are 2/4 bilaterally. CRT is instant. Positive pedal hair.   Gross sensation is intact bilaterally.   Equinus is noted bilaterally. No pain with active or passive ROM of the ankle, MTJ, 1st ray, or halluces bilaterally,. Pain noted with palpation of the medial attachments on the plantar fascias BL L>>R. No pain along the courses of the PT, peroneal, or Achilles tendons BL.   Nails normal bilaterally. No open lesions are noted.     Assessment: BL plantar fasciitis - no resolution and has tried multiple conservative treatments.     Plan:   - Pt seen and evaluated  - Would like to see if PRP injection is covered. [facility] Can perform if covered. She will let me know.   - Pt to return to clinic with info about PRP injection.

## 2019-07-26 ENCOUNTER — TELEPHONE (OUTPATIENT)
Dept: SURGERY | Facility: CLINIC | Age: 39
End: 2019-07-26

## 2019-07-26 NOTE — TELEPHONE ENCOUNTER
KAILASH Delaney patient:    Reason for call:  Other   Patient called regarding (reason for call): call back  Additional comments: Patient would like a call back to discuss alternative options to her lap band, which she has not used in years.     Phone number to reach patient:  Home number on file [phone] (home)    Best Time:  Anytime     Can we leave a detailed message on this number?  YES

## 2019-07-29 NOTE — TELEPHONE ENCOUNTER
Returning your call.  Last time you were in you had your band unfilled.  How are you doing?      She is stuck at 235#.  Can't lose any weight.  Not sure what to do.  Daughter is 2 years old.  She is not breast feeling anyone. Feels like band is in the green zone or maybe red.  If she eats steak, chicken, or salad it gets stuck. She is not hungry in between meals. Snacking between meals.  More mind hunger.      Discussed intensive follow up to check band and seeing the dietician.  Also medication weight management.     Is more interested in a medical intervention.   Recommended she also see dietician but pt declined.  Stated she did not enjoy working with dieticians here.   Informed her of our new diatician, Minerva whom she might work well with in the future. Feels she researched the macro diet and this works better for her.  Transferred her to call center to make return appointment to see me.

## 2019-08-08 ENCOUNTER — OFFICE VISIT (OUTPATIENT)
Dept: SURGERY | Facility: CLINIC | Age: 39
End: 2019-08-08
Payer: COMMERCIAL

## 2019-08-08 VITALS
DIASTOLIC BLOOD PRESSURE: 75 MMHG | SYSTOLIC BLOOD PRESSURE: 124 MMHG | BODY MASS INDEX: 39.21 KG/M2 | WEIGHT: 244 LBS | OXYGEN SATURATION: 98 % | HEIGHT: 66 IN | HEART RATE: 77 BPM

## 2019-08-08 DIAGNOSIS — E66.9 OBESITY (BMI 35.0-39.9 WITHOUT COMORBIDITY): Primary | ICD-10-CM

## 2019-08-08 DIAGNOSIS — R63.5 WEIGHT GAIN: ICD-10-CM

## 2019-08-08 DIAGNOSIS — K91.2 POSTSURGICAL NONABSORPTION: ICD-10-CM

## 2019-08-08 DIAGNOSIS — Z98.84 BARIATRIC SURGERY STATUS: ICD-10-CM

## 2019-08-08 PROCEDURE — 99215 OFFICE O/P EST HI 40 MIN: CPT | Performed by: PHYSICIAN ASSISTANT

## 2019-08-08 RX ORDER — PHENTERMINE HYDROCHLORIDE 37.5 MG/1
TABLET ORAL
Qty: 90 TABLET | Refills: 0 | Status: SHIPPED | OUTPATIENT
Start: 2019-08-08

## 2019-08-08 ASSESSMENT — MIFFLIN-ST. JEOR: SCORE: 1808.53

## 2019-08-08 NOTE — PATIENT INSTRUCTIONS
"Information about the Weight Loss Medication Phentermine      Phentermine is a stimulant medication related to the amphetamine class of medication but with a lower risk of dependence and addiction.  It is used for weight loss by suppressing the appetite region of the brain.  It also may speed up the metabolic rate and give a person more energy.  Like any medication there are potential side effects and the most common are:  Dry mouth occurs in almost everyone (hydrate well), fewer people experience Palpatations, fast heart rate, elevation of blood pressure, restlessness, insomnia, dizziness, change in mood, tremor, headache, changes in bowel movements,itchiness, changes in sex drive.    Some people can develop serious side effects which include:  Heart strain (\"ischemia\").  Tachycardia (fast heart rate or irregular heart rate).  Hypertension  Pulmonary Hypertension  Psychosis  Dependency and abuse has occurred in some.    We do not recommend taking it in combination with the following medications due to potential drug interactions which can increase the risk of side effects and/or potential for seizures:    Absolutely contraindicated are:  Amphetamines or other stimulants like ADHD medication: (dextroamphetamine, amphetamine, diethylpropion, isocarboxazid, methamphetamine, lisdexamfetamine, benzphetamine,dexmethylphenidate, methylphenidate, selegiline patch, sibutramine, tranylcypromine.    Avoid use with:   Dopamine, dobutamine, ephedra, ephedrine, epinephrine, isoproterenol, linezolid, norepinephrine, phenylephrine injection, venlafaxine (Effexor).    Monitor or modify dose with:  Acebutolol, atenolol, betaxolol, bisoprolol, carvedilol, droxidopa, esmolol, labetalol, magnesium citrate, metoprolol, nadolol, nebivolol, penbutolol, pindolol, propranolol, sotalol, timolol.    Caution with: armodafinil,betaxolol eye drops, brexpiprazole, bupropion, busulfan, caffeine, carteolol drops, enzalutaminde, ginseng, green tea, " guarana, levobunolol drops, lindane cream, modafinil, afrin nasal spray (oxymetazoline), pamabrom, phenylephrine oral and nasal spray, pseudoephedrine (sudafed), rasgiline, sleegiline, bowel prep, tiagabine, timolol drops,  TRAMADOL due to increased risk of seizures.    Dosing:  We start with half a tablet for the first 2 weeks and if tolerating it without problems, you can take up to one full tablet daily in the morning after breakfast.  You only have to use the amount effective for you, not to exceed one full tablet.  It can also be used situationaly and does not have to be taken every day. As always, if any questions give us a call the Weight Loss Clinic at [phone] or message me through [employer].

## 2019-08-08 NOTE — PROGRESS NOTES
BARIATRIC FOLLOW UP BAND VISIT     August 8, 2019       HISTORY OF PRESENT ILLNESS: Pt returns today for her follow-up appointment status post adjustable gastric band surgery. She is stuck at her current weight.  Can't lose any weight.  Not sure what to do.  Daughter is 2 years old not planning on any more children.  Wants to lose the weight.      Got lapband and lost 100 lbs.  Gained about 25-35 back before pregnancy.  Before delivery she gained 60 lbs.  After pregnancy she lost 20 lbs.  Was sitting in 230's for the last 2 years.  Lat year she was breastfeeding but didn't have time to commit to herself.  This year she hurt her foot and knee and is not unable to exercise as much. Her weight increased due to immobility. Exercise (running and cross fit) was what really helped her lose weight in the beginning.      Patient is taking the following bariatric postoperative vitamins:  1 prenatal    Pt is not exercising.  She has 2 knee injuries, back injury.      She has strained PCL and MCL. Right knee has chronic arthritis and bone spurs.  Pt is planning on getting PRP knee injection next week.  Plans on stating to cycle in the mean time. Does potentially have an opportunity [facility] and then have opportunity to do cross fit following for 3-6 months.     In regards to the patient's band, the patient feels fluid needs to be added to the band. She is hesitant to do this however.  She did have trouble with her band it the past. Even with 1 ml of fluid she had reflux and irritation.     SOCIAL HISTORY:  Pt denies smoking.  Pt denies alcohol use.  Is using NSAIDS.Starts grad school tomorrow.  She is getting a degree in Management in Technology.  Working full-time 7/4 pm at [employer] in [employer]. Does a lot of single parenting because boyfriend works shift work.      REVIEW OF SYSTEMS:  General  Fatigue: feels rested  Sleep Quality:Gets about 5-6 hours of sleep at night.  Same for the past 6 years.  "  HEENT  Hx of glaucoma: none  Cardiovascular  Chest Pain with Exertion: none  Palpitations: none  Hx of heart disease: none  HTN:none  Pulmonary  Shortness of breath at rest: none  Shortness of breath with exertion: none  Snoring: none  Gastrointestinal  Abdominal pain:  Nausea:none  Vomiting:none  Diarrhea:  Constipation:  Dysphagia:none  Abdominal Pain:none  Heartburn:none  Psychiatric  Moods Stable:  Anxiety:  Depression:lower due to excess weight, bummed due to unable to work out.   Hx of eating disorder: none  Endocrine  Polydipsia:   Neurologic  Hx of seizures: none  Headaches:none    History of kidney stones: none  History of kidney disease: none  Current birth control: condoms, abstinence    BAND ROS:  Hungry between meals:    Yes  Eating between meals:    Yes  Eat >1 cup of food at meals:    Yes  Not losing 1-2 lbs a week:    Yes  Not feeling sense of restriction:   Yes    Have pain when swallowing:    No  Have heartburn, vomiting or reflux:   No  Have night cough or hiccups:   No  Making poor food choices:    No  Unable to eat chicken, steak and bread: No    Is pregnant      No  Will be traveling to remote areas   No  Will have surgery soon.   No      PHYSICAL EXAMINATION:   /75 (BP Location: Left arm, Patient Position: Sitting, Cuff Size: Adult Large)   Pulse 77   Ht 5' 6\" (1.676 m)   Wt 244 lb (110.7 kg)   SpO2 98%   BMI 39.38 kg/m      GENERAL: Alert and oriented x3. NAD  HEART: No murmurs, rubs or gallops, Regular rate and rhythm  LUNGS: Breathing unlabored, Lung sounds clear to auscultation bilaterally  ABDOMEN: soft; obese  EXTREMITIES: No LE edema bilaterally, Gait normal  NEURO: CN II-XII Grossly Intact    ASSESSMENT AND PLAN:      1. Obesity (BMI 35.0-39.9 without comorbidity)  Obesity  Body mass index is 39.38 kg/m .   - phentermine (ADIPEX-P) 37.5 MG tablet; Take 1/2 tablet for 10 days, may increase to 1 tablets if tolerating.  Dispense: 90 tablet; Refill: 0    We discussed healthy " habits to assist with weight loss. We discussed medication that may assist with weight loss. Phentermine was prescribed. Risks/ benefits and possible side effects were discussed and questions were answered. Written information was given.     2. Bariatric surgery status  Status post adjustable gastric band.     Elected to wait on adjusting band.  Instead decided to start medical weight management. With check back in 6 weeks to see if band adjustment necessary at that time.     Instruction provided on the importance of avoiding NSAIDS and its impact on ulcer formation and/or perforation following bariatric surgery.    3. Postsurgical nonabsorption  Recommended Pt get CBC, vitamin B12, vitamin D, [facility]    Discussed restarting Calcium 600 mg PO BID and Vitamin D 5000 international unit(s) daily    4. Weight gain  We discussed the role of pharmacological agents in the treatment of obesity. Discussed that medications must always be used together with lifestyle changes such as improvements in diet choices, portion control and establishing and maintaining a regular exercise program.     Recommended she also see dietician but pt declined.  Stated she did not enjoy working with dieticians here.   Informed her of our new diatician, Minerva whom she might work well with in the future. Feels she researched the macro diet and this works better for her.      Pt is planning on getting knee injection next week.  Plans on stating to cycle in the mean time. Does potentially have an opportunity to [facility] a [facility] and then have opportunity to do Cross fit following for 3-6 months.       Follow up: Return to clinic in 6 weeks to recheck medication, weight recheck, thoughts about adjusting band, need to see dietician, if labs were drawn, if vitamins started.       I spent a total of 45 minutes face to face with Mary during today's office visit. Over 50% of this time was spent counseling the patient  and/or coordinating care.

## 2019-09-19 ENCOUNTER — OFFICE VISIT (OUTPATIENT)
Dept: SURGERY | Facility: CLINIC | Age: 39
End: 2019-09-19
Payer: COMMERCIAL

## 2019-09-19 VITALS
WEIGHT: 237.2 LBS | HEIGHT: 66 IN | SYSTOLIC BLOOD PRESSURE: 126 MMHG | OXYGEN SATURATION: 98 % | HEART RATE: 65 BPM | BODY MASS INDEX: 38.12 KG/M2 | DIASTOLIC BLOOD PRESSURE: 78 MMHG

## 2019-09-19 DIAGNOSIS — E66.9 OBESITY (BMI 35.0-39.9 WITHOUT COMORBIDITY): ICD-10-CM

## 2019-09-19 DIAGNOSIS — Z98.84 BARIATRIC SURGERY STATUS: Primary | ICD-10-CM

## 2019-09-19 PROCEDURE — 99213 OFFICE O/P EST LOW 20 MIN: CPT | Performed by: PHYSICIAN ASSISTANT

## 2019-09-19 RX ORDER — VITAMIN A ACETATE, .BETA.-CAROTENE, ASCORBIC ACID, CHOLECALCIFEROL, .ALPHA.-TOCOPHEROL ACETATE, DL-, THIAMINE MONONITRATE, RIBOFLAVIN, NIACINAMIDE, PYRIDOXINE HYDROCHLORIDE, FOLIC ACID, CYANOCOBALAMIN, CALCIUM CARBONATE, FERROUS FUMARATE, ZINC OXIDE, AND CUPRIC OXIDE 2000; 2000; 120; 400; 22; 1.84; 3; 20; 10; 1; 12; 200; 27; 25; 2 [IU]/1; [IU]/1; MG/1; [IU]/1; MG/1; MG/1; MG/1; MG/1; MG/1; MG/1; UG/1; MG/1; MG/1; MG/1; MG/1
1 TABLET ORAL DAILY
COMMUNITY

## 2019-09-19 ASSESSMENT — MIFFLIN-ST. JEOR: SCORE: 1777.68

## 2019-09-19 NOTE — PROGRESS NOTES
B[facility] Care Clinic Non Surgical Follow up Visit   Date of visit: 9/19/2019  Physician: Charity Delaney PA-C, MD  Primary Care is Gabriela Godoy.  Mary Reyes   38 year old  female     Today's Weight:   Wt Readings from Last 1 Encounters:   09/19/19 237 lb 3.2 oz (107.6 kg)     Body mass index is 38.29 kg/m .  Wt change since last visit (lbs): -6.8  Cumulative weight loss (lbs): 13.9     Assessment and Plan   Assessment: Mary is a 38 year old year old female with Obesity who presents for medical weight management.    Plan:    Obesity    Patient was congratulated on her weight loss success thus far. Healthy habits to assist with further weight loss were discussed. Marina will continue the phentermine 37.5 mg  1/2 tablet  daily.     2. Bariatric surgery status  Status post adjustable gastric band.     Elected to wait on adjusting band.  Instead decided to start medical weight management. With check back in 6 weeks to see if band adjustment necessary at that time    Follow up: Return to clinic in 2 months.              INTERIM HISTORY  Enjoyed the extra energy the phentermine has allowed.  Feels like she is less hungry.  Has had no  increased anxiety.  No constipation.  Does have dry mouth but is drinking more fluids due to this.     Positive Changes Since Last Visit: Feels like she is less hungry.   Struggling With: pace of weight loss, felt she should be losing more.     Knowledgeable in Reading Food Labels: yes  Getting Adequate Protein: yes  Sleep Quality:Gets about 5-6 hours of sleep at night.  Same for the past 6 years.   Stress management: She is getting a degree in Management in Technology.  Working full-time 7/4 pm at [employer] in [employer]. Does a lot of single parenting because boyfriend works shift work.        PHYSICAL ACTIVITY PATTERNS:Pt is not exercising.  She has 2 knee injuries, back injury.   Did a [employer] run and paid for it afterward.  Her knee swelled and they needed to drawn fluid off.  "    REVIEW OF SYSTEMS  GENERAL/CONSTITUTIONAL:  Fatigue:   HEENT:  Vision changes, glaucoma: none  Dry Mouth: present, manageable  CARDIOVASCULAR:  Chest Pain with Exertion: None  Palpitations: none   GI:  N/V/D/C: Denies  PSYCHIATRIC:  Moods: stable         Past Medical History   Past Medical History:   Diagnosis Date     PONV (postoperative nausea and vomiting)      Patient Active Problem List   Diagnosis     Bariatric surgery status     Postsurgical nonabsorption     Past Surgical History  She has a past surgical history that includes ENT surgery; Laparoscopic gastric adjustable banding (1/26/2012); Laparoscopic gastric adjustable banding (8/8/2013); and Replace port gastric band (8/8/2013).     Examination   /78 (BP Location: Right arm, Patient Position: Sitting, Cuff Size: Adult Large)   Pulse 65   Ht 5' 6\" (1.676 m)   Wt 237 lb 3.2 oz (107.6 kg)   SpO2 98%   BMI 38.29 kg/m     GENERAL: Alert and oriented x3. NAD  HEENT: PEERL  HEART: No murmurs, rubs or gallops, Regular rate and rhythm  LUNGS: Breathing unlabored. Lung sounds clear to auscultation bilaterally  EXTREMITIES: No LE edema bilaterally  PSYCH: Mood stable       Counseling:   We discussed that medications must always be used together with lifestyle changes such as improvements in diet choices, portion control and establishing and maintaining a regular exercise program. Good weight loss is 0.5-2 lbs a week.      > 25 min spent with patient, > 50% spent in counseling         Charity Delaney PA-C  [facility]                "

## 2020-03-01 ENCOUNTER — HEALTH MAINTENANCE LETTER (OUTPATIENT)
Age: 40
End: 2020-03-01

## 2020-07-30 NOTE — PROGRESS NOTES
"Mary Reyes is a 39 year old female who is being evaluated via a billable video visit.      The patient has been notified of following:     \"This video visit will be conducted via a call between you and your physician/provider. We have found that certain health care needs can be provided without the need for an in-person physical exam.  This service lets us provide the care you need with a video conversation.  If a prescription is necessary we can send it directly to your pharmacy.  If lab work is needed we can place an order for that and you can then stop by our lab to have the test done at a later time.    Video visits are billed at different rates depending on your insurance coverage.  Please reach out to your insurance provider with any questions.    If during the course of the call the physician/provider feels a video visit is not appropriate, you will not be charged for this service.\"    Patient has given verbal consent for Video visit? Yes  How would you like to obtain your AVS? MyChart  If you are dropped from the video visit, the video invite should be resent to: Text to cell phone: [phone]  Will anyone else be joining your video visit? No        Video-Visit Details    Type of service:  Video Visit    Video Start Time: 3:10 PM  Video End Time: 3:28 PM    Originating Location (pt. Location): Home    Distant Location (provider location):  [facility]     Platform used for Video Visit: Beverley Delaney PA-C    2020    Return Medical Weight Management Note     Mary Reyes  MRN:  [id number]  :  1980      Dear Gabriela Godoy,    I had the pleasure of doing a virtual visit with your patient Mary Reyes.  She is a 39 year old female who I am continuing to see for treatment of obesity.  She is status post gastric band with weight regain.   INTERVAL HISTORY: Pt started phentermine last 2019. This worked well for her.  She " enjoyed the extra energy and felt like she was less hungry. She was not working out at that time due to orthopedic inkuries following a ruck run.    She did not stay on the medication because she was not ready to commit to weight loss.      This summer she hit  250 lbs.  Has 3-4 weeks of phentermine left.  Restarted phentermine 18.75 mg in July.  Has been on it for about 3 weeks and now at 233 lbs.    Has a little more energy on the phentermine.  Denies anxiety, palpations.      Pt had knee surgery 2 weeks ago.  They did a scope and mincus repair, patalla debridement. IS able to bike now without pain.  Happy she did the surgery.     Pt not interested in band adjustment. Pt still has problems when eating salads with her band.  Had all fluid removed.       BP Readings from Last 6 Encounters:   09/19/19 126/78   08/08/19 124/75   10/06/18 120/72   09/07/18 116/70   07/27/18 118/60   06/26/18 116/80         Social Hx:  Life is chaotic.  She in working full time and taking care of her 3 year old daughter during the day.  Also going to grad school.  Has been on break since July.  Will restart in Sept.       CURRENT WEIGHT (PATIENT REPORTED):  233 lbs 12.8 oz    Wt Readings from Last 4 Encounters:   07/31/20 233 lb 12.8 oz (106.1 kg)   09/19/19 237 lb 3.2 oz (107.6 kg)   08/08/19 244 lb (110.7 kg)   10/06/18 217 lb (98.4 kg)     BARIATRIC METRICS:    Current Weight: 233 lb 12.8 oz (106.1 kg)  Body mass index is 37.74 kg/m .   Wt change since last visit (lbs): -3.4  Cumulative weight loss (lbs): 17.3      DIETARY HISTORY  Meals Per Day: 3 meals a day.  Food Diary:   B: coffee, light breakfast.    L:Premixed salad or protein drink   D:Protein, vegetable, and smaller carb.    Snacks Per Day: yes  Typical Snack: [employer], bag of chips.      Exercise:   Went  For a mile and half bike ride today.  Had been walking with her dog.        REVIEW OF SYSTEMS  GENERAL/CONSTITUTIONAL:  HEENT:  Vision changes, glaucoma: none  Dry Mouth:  present, manageable  CARDIOVASCULAR:  Chest Pain with Exertion: None  Palpitations: none   GI:  N/V/D/C: Denies  PSYCHIATRIC:  Moods: stable    MEDICATIONS:   Current Outpatient Medications   Medication     Cyanocobalamin (VITAMIN B-12 SL)     IRON CR PO     phentermine (ADIPEX-P) 37.5 MG tablet     Prenatal Vit-Fe Fumarate-FA (PNV PRENATAL PLUS MULTIVITAMIN) 27-1 MG TABS per tablet     Multiple Vitamin (MULTI-VITAMIN PO)     Omega-3 Fatty Acids (FISH OIL CONCENTRATE PO)     VITAMIN D, ERGOCALCIFEROL, PO     No current facility-administered medications for this visit.        LABS:  Hemoglobin A1C   Date Value Ref Range Status   08/22/2011 5.3 4.3 - 6.0 % Final     Vitamin D Deficiency screening   Date Value Ref Range Status   08/16/2012 31 30 - 75 ug/L Final     Comment:     Season, race, dietary intake, and treatment affect the concentration of   25-hydroxy-Vitamin D. Values may decrease during winter months and increase   during summer months. Values less than 30 ug/L may indicate Vitamin D   deficiency.   Vitamin D determiniation is routinely performed by an immunoassay specific   for   25 hydroxyvitamin D3.  If an individual is on vitamin D2 (ergocalciferol)   supplementation, please specify 25 OH vitamin D2 and D3 level determination   by   LCMSMS test VITD23.  For questions, please contact the laboratory at   [phone].     TSH   Date Value Ref Range Status   09/21/2016 2.00 0.30 - 5.00 uIU/mL Final     Sodium   Date Value Ref Range Status   01/27/2012 136 133 - 144 mmol/L Final     Potassium   Date Value Ref Range Status   09/23/2016 3.6 3.5 - 5.0 mmol/L Final     Chloride   Date Value Ref Range Status   01/27/2012 103 94 - 109 mmol/L Final     Carbon Dioxide   Date Value Ref Range Status   01/27/2012 27 20 - 32 mmol/L Final     Anion Gap   Date Value Ref Range Status   01/27/2012 6 6 - 17 mmol/L Final     Glucose   Date Value Ref Range Status   09/23/2016 103 74 - 106 mg/dL Final     Creatinine   Date  "Value Ref Range Status   09/23/2016 0.8 0.5 - 1 mg/dL Final     GFR Estimate   Date Value Ref Range Status   09/23/2016 >60 >=60 ml/min/1.73m2 Final     WBC   Date Value Ref Range Status   01/24/2013 5.4 4.0 - 11.0 10e9/L Final     Hemoglobin   Date Value Ref Range Status   01/24/2013 11.7 11.7 - 15.7 g/dL Final     Hematocrit   Date Value Ref Range Status   01/24/2013 34.8 (L) 35.0 - 47.0 % Final     MCV   Date Value Ref Range Status   01/24/2013 88 78 - 100 fl Final     Platelet Count   Date Value Ref Range Status   01/24/2013 236 150 - 450 10e9/L Final      PE:  Ht 5' 6\" (1.676 m)   Wt 233 lb 12.8 oz (106.1 kg)   BMI 37.74 kg/m    GENERAL: Healthy, alert and no distress  EYES: Eyes grossly normal to inspection.  No discharge or erythema, or obvious scleral/conjunctival abnormalities.  RESP: No audible wheeze, cough, or visible cyanosis.  No visible retractions or increased work of breathing.    SKIN: Visible skin clear. No significant rash, abnormal pigmentation or lesions.  NEURO: Cranial nerves grossly intact.  Mentation and speech appropriate for age.  PSYCH: Mentation appears normal, affect normal/bright, judgement and insight intact, normal speech and appearance well-groomed.    ASSESSMENT AND PLAN:    1. Obesity (BMI 35.0-39.9 without comorbidity)  Patient was congratulated on weight maritza success thus far. Healthy habits to assist with further weight loss were discussed. Marina will continue the phentermine.  - phentermine (ADIPEX-P) 37.5 MG tablet; Take 1/2 tablet in the morning for 7 days may increase to a full tablet if tolerating and needed.  Dispense: 90 tablet; Refill: 0    Pt will mychart with recent blood pressure drawn at [facility] 2 weeks ago.      PT has done well when she meal prepped.  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   Plans on doing this on Sunday and Wednesday.    Will start having protein drink for breakfast.     2. Bariatric surgery status  S/P Gastric " band    3. Malnutrition following surgery   Will get annual blood work at next visit as well as CMP.       FOLLOW-UP:  Return to clinic in 6-8 weeks.

## 2020-07-31 ENCOUNTER — VIRTUAL VISIT (OUTPATIENT)
Dept: SURGERY | Facility: CLINIC | Age: 40
End: 2020-07-31
Payer: COMMERCIAL

## 2020-07-31 VITALS — HEIGHT: 66 IN | WEIGHT: 233.8 LBS | BODY MASS INDEX: 37.57 KG/M2

## 2020-07-31 DIAGNOSIS — Z98.84 BARIATRIC SURGERY STATUS: ICD-10-CM

## 2020-07-31 DIAGNOSIS — K91.2 MALNUTRITION FOLLOWING GASTROINTESTINAL SURGERY: ICD-10-CM

## 2020-07-31 DIAGNOSIS — E66.9 OBESITY (BMI 35.0-39.9 WITHOUT COMORBIDITY): Primary | ICD-10-CM

## 2020-07-31 PROCEDURE — 99213 OFFICE O/P EST LOW 20 MIN: CPT | Mod: 95 | Performed by: PHYSICIAN ASSISTANT

## 2020-07-31 RX ORDER — PHENTERMINE HYDROCHLORIDE 37.5 MG/1
TABLET ORAL
Qty: 90 TABLET | Refills: 0 | Status: SHIPPED | OUTPATIENT
Start: 2020-07-31 | End: 2021-01-05

## 2020-07-31 ASSESSMENT — MIFFLIN-ST. JEOR: SCORE: 1757.26

## 2020-07-31 NOTE — PATIENT INSTRUCTIONS
Get back to meal prepping.  Sunday and Wednesday.    Start protein drink for breakfast.  Continue on phentermine.    Please get blood pressure checked 1-2 weeks after starting phentermine. If greater than 140/90 contact clinic. Can get BP at  Pharmacy, Primary care office (nurse visit),[facility] [place] in [facility]. Call [phone] for appt.     Can also send BP from last week at [facility] [place] in [facility].    FOLLOW-UP:  Return to clinic in 6-8 weeks.     Bariatric Post Op Guidelines  General:    To avoid marginal ulcers avoid all forms of tobacco, alcohol in excess, caffeine, and NSAIDS (unless indicated for cardioprotection or othewise and opposed by a PPI).    Exercise is key for continued weight loss and weight maintenance. Aim for 30-60 minutes of physical activity most days of the week. Include cardiovascular and strength training.    Continue lifelong vitamins supplementation and annual lab follow up.  All  patients should supplement with the following bariatric postoperative vitamins:  2 Complete multivitamins with minerals (at different times than calcium)  Vitamin D 5000 Int Units/125 mg daily   Calcium 600 mg twice daily or 500 mg hree times daily   Vitamin B12: 500 mcg of sl daily or 1000 mcg Inj monthly  B complex daily or Thiamine 100 mg weekly  1 Iron/Vit C. Daily for females who menstruate and/or as directed    The bariatric team should be aware and potentially evaluate all adverse gastrointestinal symptoms which can be a sign of complication. Inability to tolerate textured solid food (chicken, steak, fish) may need to be evaluated by endoscopy.    There is a 10% increase of Alcohol Use Disorder in patients with bariatric surgery.   Most often occurring around 2 years post op.  Please call the clinic if you feel alcohol is interfering in your daily life.  We can help.     Follow up annually lifelong. Obesity is a chronic disease.  Weight gain can be expected. The goal of  follow-up visits is to ensure adequate vitamin and protein absorption, evaluate food intake behavior, review exercise/activity level, and assist with weight regain.    Nutritional:  Eat 3 meals per day  (No snacks between meals.)  Do not skip meals.  This can cause overeating at the next meal and will prevent adequate protein and nutritional intake.    Aim for 60-80 grams of protein per day.  Always eat your protein first. This assists with optimal nutrition and helps you stay full longer.    Eat your protein first, and then follow with fiber.    Add fiber by including fruits, vegetables, whole grains, and beans.     Portions should be about 1 cup per meal. Use measuring cups to be accurate.  Continue to use saucer/salad plates, infant/toddler silverware to keep portion sizes small and take small bites.    Eat S-L-O-W-L-Y to make each meal last 20-30 minutes. Always stop eating when satisfied.    Aim for 64 oz. of calorie-free fluids daily.    Avoid drinking 30 min before, during, and 30 min after meal    Avoid high sugar and high fat foods to prevent high calorie intake. This will reduce your rate of weight loss and can cause weight regain.   Check nutrition labels for less than 10 grams of sugar and less than 10 grams of fat per serving.

## 2020-12-14 ENCOUNTER — HEALTH MAINTENANCE LETTER (OUTPATIENT)
Age: 40
End: 2020-12-14

## 2021-01-15 ENCOUNTER — HEALTH MAINTENANCE LETTER (OUTPATIENT)
Age: 41
End: 2021-01-15

## 2021-04-18 ENCOUNTER — HEALTH MAINTENANCE LETTER (OUTPATIENT)
Age: 41
End: 2021-04-18

## 2021-05-31 VITALS — BODY MASS INDEX: 40.82 KG/M2 | WEIGHT: 254 LBS | HEIGHT: 66 IN

## 2021-06-12 NOTE — ANESTHESIA PROCEDURE NOTES
Spinal Block    Patient location during procedure: OR  Start time: 8/29/2017 7:57 AM  End time: 8/29/2017 7:59 AM  Reason for block: primary anesthetic    Staffing:  Performing  Anesthesiologist: TAYLOR MANE IV    Preanesthetic Checklist  Completed: patient identified, risks, benefits, and alternatives discussed, timeout performed, consent obtained, at patient's request, airway assessed, oxygen available, suction available, emergency drugs available and hand hygiene performed  Spinal Block  Patient position: sitting  Prep: ChloraPrep  Patient monitoring: heart rate, cardiac monitor, continuous pulse ox and blood pressure  Approach: midline  Location: L4-5  Injection technique: single-shot  Needle type: pencil-tip   Needle gauge: 24 G

## 2021-06-12 NOTE — ANESTHESIA POSTPROCEDURE EVALUATION
Patient: Mary Reyes  PRIMARY  SECTION  Anesthesia type: spinal    Patient location: PACU  Last vitals:   Vitals:    17 1633   BP:    Pulse:    Resp: 16   Temp:    SpO2: 98%     Post vital signs: stable  Level of consciousness: awake and responds to simple questions  Post-anesthesia pain: pain controlled  Post-anesthesia nausea and vomiting: no  Pulmonary: unassisted, return to baseline  Cardiovascular: stable and blood pressure at baseline  Hydration: adequate  Anesthetic events: no    QCDR Measures:  ASA# 11 - Claire-op Cardiac Arrest: ASA11B - Patient did NOT experience unanticipated cardiac arrest  ASA# 12 - Claire-op Mortality Rate: ASA12B - Patient did NOT die  ASA# 13 - PACU Re-Intubation Rate: ASA13B - Patient did NOT require a new airway mgmt  ASA# 10 - Composite Anes Safety: ASA10A - No serious adverse event  ASA# 38 - New Corneal Injury: ASA38A - No new exposure keratitis or corneal abrasion in PACU    Additional Notes:

## 2021-06-12 NOTE — ANESTHESIA PREPROCEDURE EVALUATION
Anesthesia Evaluation      Patient summary reviewed   No history of anesthetic complications     Airway   Mallampati: II  Neck ROM: full   Pulmonary - negative ROS and normal exam                          Cardiovascular - negative ROS and normal exam   Neuro/Psych - negative ROS     Endo/Other - negative ROS      GI/Hepatic/Renal - negative ROS           Dental                         Anesthesia Plan  Planned anesthetic: spinal    ASA 2     Anesthetic plan and risks discussed with: patient

## 2021-06-12 NOTE — ANESTHESIA CARE TRANSFER NOTE
Last vitals:   Vitals:    08/29/17 0849   BP: 125/78   Pulse: 77   Resp: 16   Temp: 36.7  C (98  F)   SpO2: 100%     Patient's level of consciousness is awake  Spontaneous respirations: yes  Maintains airway independently: yes  Dentition unchanged: yes  Oropharynx: oropharynx clear of all foreign objects    QCDR Measures:  ASA# 20 - Surgical Safety Checklist: WHO surgical safety checklist completed prior to induction  PQRS# 430 - Adult PONV Prevention: 4558F-8P - Pt did NOT receive => 2 anti-emetic agents  ASA# 8 - Peds PONV Prevention: 4558F-8P - Pt did NOT receive => 2 antiemetic agents  PQRS# 424 - Claire-op Temp Management: 4559F - At least one body temp DOCUMENTED => 35.5C or 95.9F within required timeframe  PQRS# 426 - PACU Transfer Protocol: - Transfer of care checklist used  ASA# 14 - Acute Post-op Pain: ASA14B - Patient did NOT experience pain >= 7 out of 10

## 2021-07-14 PROBLEM — Z34.90 PREGNANCY: Status: RESOLVED | Noted: 2017-08-29 | Resolved: 2017-09-01

## 2021-10-02 ENCOUNTER — HEALTH MAINTENANCE LETTER (OUTPATIENT)
Age: 41
End: 2021-10-02

## 2022-05-14 ENCOUNTER — HEALTH MAINTENANCE LETTER (OUTPATIENT)
Age: 42
End: 2022-05-14

## 2022-09-03 ENCOUNTER — HEALTH MAINTENANCE LETTER (OUTPATIENT)
Age: 42
End: 2022-09-03

## 2023-06-03 ENCOUNTER — HEALTH MAINTENANCE LETTER (OUTPATIENT)
Age: 43
End: 2023-06-03

## 2024-02-17 ENCOUNTER — HEALTH MAINTENANCE LETTER (OUTPATIENT)
Age: 44
End: 2024-02-17